# Patient Record
Sex: MALE | Race: WHITE | NOT HISPANIC OR LATINO | URBAN - METROPOLITAN AREA
[De-identification: names, ages, dates, MRNs, and addresses within clinical notes are randomized per-mention and may not be internally consistent; named-entity substitution may affect disease eponyms.]

---

## 2017-07-26 ENCOUNTER — OFFICE VISIT (OUTPATIENT)
Dept: FAMILY MEDICINE | Facility: CLINIC | Age: 37
End: 2017-07-26
Payer: COMMERCIAL

## 2017-07-26 VITALS
OXYGEN SATURATION: 98 % | DIASTOLIC BLOOD PRESSURE: 68 MMHG | HEART RATE: 51 BPM | HEIGHT: 72 IN | TEMPERATURE: 98.3 F | SYSTOLIC BLOOD PRESSURE: 108 MMHG | WEIGHT: 184 LBS | BODY MASS INDEX: 24.92 KG/M2

## 2017-07-26 DIAGNOSIS — Z13.1 SCREENING FOR DIABETES MELLITUS: ICD-10-CM

## 2017-07-26 DIAGNOSIS — Z00.00 ENCOUNTER FOR ROUTINE ADULT HEALTH EXAMINATION WITHOUT ABNORMAL FINDINGS: Primary | ICD-10-CM

## 2017-07-26 DIAGNOSIS — Z13.6 CARDIOVASCULAR SCREENING; LDL GOAL LESS THAN 160: ICD-10-CM

## 2017-07-26 DIAGNOSIS — K64.9 HEMORRHOIDS, UNSPECIFIED HEMORRHOID TYPE: ICD-10-CM

## 2017-07-26 LAB
CHOLEST SERPL-MCNC: 180 MG/DL
ERYTHROCYTE [DISTWIDTH] IN BLOOD BY AUTOMATED COUNT: 13.2 % (ref 10–15)
GLUCOSE SERPL-MCNC: 90 MG/DL (ref 70–99)
HCT VFR BLD AUTO: 45.9 % (ref 40–53)
HDLC SERPL-MCNC: 65 MG/DL
HGB BLD-MCNC: 15.1 G/DL (ref 13.3–17.7)
LDLC SERPL CALC-MCNC: 90 MG/DL
MCH RBC QN AUTO: 31.2 PG (ref 26.5–33)
MCHC RBC AUTO-ENTMCNC: 32.9 G/DL (ref 31.5–36.5)
MCV RBC AUTO: 95 FL (ref 78–100)
NONHDLC SERPL-MCNC: 115 MG/DL
PLATELET # BLD AUTO: 188 10E9/L (ref 150–450)
RBC # BLD AUTO: 4.84 10E12/L (ref 4.4–5.9)
TRIGL SERPL-MCNC: 125 MG/DL
TSH SERPL DL<=0.005 MIU/L-ACNC: 1.92 MU/L (ref 0.4–4)
WBC # BLD AUTO: 5.1 10E9/L (ref 4–11)

## 2017-07-26 PROCEDURE — 84443 ASSAY THYROID STIM HORMONE: CPT | Performed by: PHYSICIAN ASSISTANT

## 2017-07-26 PROCEDURE — 85027 COMPLETE CBC AUTOMATED: CPT | Performed by: PHYSICIAN ASSISTANT

## 2017-07-26 PROCEDURE — 80061 LIPID PANEL: CPT | Performed by: PHYSICIAN ASSISTANT

## 2017-07-26 PROCEDURE — 99395 PREV VISIT EST AGE 18-39: CPT | Performed by: PHYSICIAN ASSISTANT

## 2017-07-26 PROCEDURE — 82947 ASSAY GLUCOSE BLOOD QUANT: CPT | Performed by: PHYSICIAN ASSISTANT

## 2017-07-26 PROCEDURE — 36415 COLL VENOUS BLD VENIPUNCTURE: CPT | Performed by: PHYSICIAN ASSISTANT

## 2017-07-26 RX ORDER — POLYETHYLENE GLYCOL 3350 17 G/17G
1 POWDER, FOR SOLUTION ORAL DAILY
COMMUNITY
End: 2018-03-05

## 2017-07-26 NOTE — LETTER
Artie Davis  [address] [address]        July 31, 2017          Dear ,    We are writing to inform you of your test results.    -Cholesterol levels (LDL,HDL, Triglycerides) are normal. ADVISE: rechecking in 5 years.  -TSH (thyroid stimulating hormone) level is normal which indicates normal thyroid function.  -Normal red blood cell (hgb) levels, normal white blood cell count and normal platelet levels.  -Glucose (diabetic screening test) is normal.    Resulted Orders   TSH with free T4 reflex   Result Value Ref Range    TSH 1.92 0.40 - 4.00 mU/L   CBC with platelets   Result Value Ref Range    WBC 5.1 4.0 - 11.0 10e9/L    RBC Count 4.84 4.4 - 5.9 10e12/L    Hemoglobin 15.1 13.3 - 17.7 g/dL    Hematocrit 45.9 40.0 - 53.0 %    MCV 95 78 - 100 fl    MCH 31.2 26.5 - 33.0 pg    MCHC 32.9 31.5 - 36.5 g/dL    RDW 13.2 10.0 - 15.0 %    Platelet Count 188 150 - 450 10e9/L   Lipid panel reflex to direct LDL   Result Value Ref Range    Cholesterol 180 <200 mg/dL    Triglycerides 125 <150 mg/dL      Comment:      Fasting specimen    HDL Cholesterol 65 >39 mg/dL    LDL Cholesterol Calculated 90 <100 mg/dL      Comment:      Desirable:       <100 mg/dl    Non HDL Cholesterol 115 <130 mg/dL   Glucose   Result Value Ref Range    Glucose 90 70 - 99 mg/dL      Comment:      Fasting specimen       If you have any questions or concerns, please call the clinic at the number listed above.       Sincerely,        Apolonia Jamil PA-C

## 2017-07-26 NOTE — NURSING NOTE
Chief Complaint   Patient presents with     Physical       Initial /68  Pulse 51  Temp 98.3  F (36.8  C) (Oral)  Ht 6' (1.829 m)  Wt 184 lb (83.5 kg)  SpO2 98%  BMI 24.95 kg/m2 Estimated body mass index is 24.95 kg/(m^2) as calculated from the following:    Height as of this encounter: 6' (1.829 m).    Weight as of this encounter: 184 lb (83.5 kg).  Medication Reconciliation: complete

## 2017-07-26 NOTE — PROGRESS NOTES
"  SUBJECTIVE:   CC: Artie Davis is an 36 year old male who presents for preventative health visit.     Healthy Habits:    Do you get at least three servings of calcium containing foods daily (dairy, green leafy vegetables, etc.)? yes    Amount of exercise or daily activities, outside of work: 5 day(s) per week - used to be 300 lbs and started exercise routine and was able to obtain normal BMI.     Problems taking medications regularly No    Medication side effects: No    Have you had an eye exam in the past two years? yes    Do you see a dentist twice per year? yes  Do you have sleep apnea, excessive snoring or daytime drowsiness? no    1) Chronic hemorrhoid and anal fissure issue for 8-10 yrs. Was evaluate at colorectal specialists last year and has a follow-up next week for same. Reports he's tried everything like increasing fiber and miralax, but is wondering if he should have a surgery. Concerned about chronic use of miralax.   Concerned if there is any increased risk for cancer as a result of this history.  Takes 1/2 dose of miralax or full serving daily.   If keeps diet stable does well, but if changes anything he \"pays the price\" and fissure will cause problems for him.  Was screened for thyroid disease and this was negative. Doesn't feel like he's constipated.   A few drops of blood sometimes, but not regularly.   Stools BID and denies any constipation.    Today's PHQ-2 Score:   PHQ-2 ( 1999 Pfizer) 5/12/2014 6/19/2012   Q1: Little interest or pleasure in doing things 0 0   Q2: Feeling down, depressed or hopeless 0 0   PHQ-2 Score 0 0       Abuse: Current or Past(Physical, Sexual or Emotional)- No  Do you feel safe in your environment - Yes    Social History   Substance Use Topics     Smoking status: Never Smoker     Smokeless tobacco: Never Used     Alcohol use 5.4 oz/week     9 Standard drinks or equivalent per week     The patient does not drink >3 drinks per day nor >7 drinks per week.    Last PSA: No " results found for: PSA    Reviewed orders with patient. Reviewed health maintenance and updated orders accordingly - Yes  BP Readings from Last 3 Encounters:   07/26/17 108/68   10/16/15 120/80   05/12/14 102/60    Wt Readings from Last 3 Encounters:   07/26/17 184 lb (83.5 kg)   10/16/15 186 lb (84.4 kg)   05/12/14 189 lb (85.7 kg)                  Patient Active Problem List   Diagnosis     CARDIOVASCULAR SCREENING; LDL GOAL LESS THAN 160     Trochanteric bursitis, right     Callus of foot     Past Surgical History:   Procedure Laterality Date     HC TOOTH EXTRACTION W/FORCEP  1999     HERNIA REPAIR  1987    Double hernia repair, groin       Social History   Substance Use Topics     Smoking status: Never Smoker     Smokeless tobacco: Never Used     Alcohol use 5.4 oz/week     9 Standard drinks or equivalent per week     Family History   Problem Relation Age of Onset     Cardiovascular Father      CAD         Current Outpatient Prescriptions   Medication Sig Dispense Refill     polyethylene glycol (MIRALAX/GLYCOLAX) powder Take 1 capful by mouth daily       Multiple Vitamin (MULTIVITAMINS PO) Take  by mouth.       No Known Allergies        Reviewed and updated as needed this visit by clinical staffTobacco  Allergies  Meds  Med Hx  Surg Hx  Fam Hx  Soc Hx        Reviewed and updated as needed this visit by Provider  Tobacco  Allergies  Meds  Med Hx  Surg Hx  Fam Hx  Soc Hx             ROS:  C: NEGATIVE for fever, chills, change in weight  I: NEGATIVE for worrisome rashes, moles or lesions  E: NEGATIVE for vision changes or irritation  ENT: NEGATIVE for ear, mouth and throat problems  R: NEGATIVE for significant cough or SOB  CV: NEGATIVE for chest pain, palpitations or peripheral edema  GI: + for anal fissure. NEGATIVE for nausea, abdominal pain, heartburn, or change in bowel habits   male: negative for dysuria, hematuria, decreased urinary stream, erectile dysfunction, urethral discharge  M: NEGATIVE  for significant arthralgias or myalgia  N: NEGATIVE for weakness, dizziness or paresthesias  P: NEGATIVE for changes in mood or affect    OBJECTIVE:   /68  Pulse 51  Temp 98.3  F (36.8  C) (Oral)  Ht 6' (1.829 m)  Wt 184 lb (83.5 kg)  SpO2 98%  BMI 24.95 kg/m2  EXAM:  GENERAL: healthy, alert and no distress  EYES: Eyes grossly normal to inspection, PERRL and conjunctivae and sclerae normal  HENT: ear canals and TM's normal, nose and mouth without ulcers or lesions  NECK: no adenopathy, no asymmetry, masses, or scars and thyroid normal to palpation  RESP: lungs clear to auscultation - no rales, rhonchi or wheezes  CV: sinus bradycardia c/w hx of being an active runner, normal S1 S2, no S3 or S4, no murmur, click or rub, no peripheral edema and peripheral pulses strong  ABDOMEN: soft, nontender, no hepatosplenomegaly, no masses and bowel sounds normal  RECTAL: almost circumferential external hemorrhoids noted with largest in region of 6-7'o'clock position. No thrombosis. Pt declines BENIGNO as will be seeing colo-rectal specialist next week and this can cause disruption to know anal fissures so further exam not completed today and deferred to specialist.  MS: no gross musculoskeletal defects noted, no edema  SKIN: no suspicious lesions or rashes  NEURO: Normal strength and tone, mentation intact and speech normal  PSYCH: mentation appears normal, affect normal/bright    ASSESSMENT/PLAN:       ICD-10-CM    1. Encounter for routine adult health examination without abnormal findings Z00.00    2. Hemorrhoids, unspecified hemorrhoid type K64.9 TSH with free T4 reflex     CBC with platelets   3. CARDIOVASCULAR SCREENING; LDL GOAL LESS THAN 160 Z13.6 Lipid panel reflex to direct LDL   4. Screening for diabetes mellitus Z13.1 Glucose   Most of our discussion centered around his hx of hemorrhoids and maximizing preventative measures with staying well hydrated, increase fiber intake and use of miralax to keep stools  soft. Pt wondered about increase risk of cancer due to history of fissure and hemorrhoid and reviewed with him that to my knowledge I do not know that to be true. Certainly should he wish we can always have him complete a colonoscopy. Pt does not feel he has any obstructive pathology and can stool easily though so declines. I encouraged him to discuss further with colo-rectal specialists and he will be seen next week as planned.   Will obtain fasting labs and f/u with up with results.     COUNSELING:  Reviewed preventive health counseling, as reflected in patient instructions       Regular exercise       Healthy diet/nutrition       Colon cancer screening       reports that he has never smoked. He has never used smokeless tobacco.      Estimated body mass index is 24.95 kg/(m^2) as calculated from the following:    Height as of this encounter: 6' (1.829 m).    Weight as of this encounter: 184 lb (83.5 kg).       Counseling Resources:  ATP IV Guidelines  Pooled Cohorts Equation Calculator  FRAX Risk Assessment  ICSI Preventive Guidelines  Dietary Guidelines for Americans, 2010  USDA's MyPlate  ASA Prophylaxis  Lung CA Screening    Apolonia Jamil PA-C  [facility]

## 2017-07-26 NOTE — MR AVS SNAPSHOT
After Visit Summary   7/26/2017    Artie Davis    MRN: [id number]           Patient Information     Date Of Birth          1980        Visit Information        Provider Department      7/26/2017 8:20 AM Apolonia Jamil PA-C [facility] [place]        Today's Diagnoses     Hemorrhoids, unspecified hemorrhoid type    -  1    CARDIOVASCULAR SCREENING; LDL GOAL LESS THAN 160        Screening for diabetes mellitus          Care Instructions      Preventive Health Recommendations  Male Ages 26 - 39    Yearly exam:             See your health care provider every year in order to  o   Review health changes.   o   Discuss preventive care.    o   Review your medicines if your doctor has prescribed any.    You should be tested each year for STDs (sexually transmitted diseases), if you re at risk.     After age 35, talk to your provider about cholesterol testing. If you are at risk for heart disease, have your cholesterol tested at least every 5 years.     If you are at risk for diabetes, you should have a diabetes test (fasting glucose).  Shots: Get a flu shot each year. Get a tetanus shot every 10 years.     Nutrition:    Eat at least 5 servings of fruits and vegetables daily.     Eat whole-grain bread, whole-wheat pasta and brown rice instead of white grains and rice.     Talk to your provider about Calcium and Vitamin D.     Lifestyle    Exercise for at least 150 minutes a week (30 minutes a day, 5 days a week). This will help you control your weight and prevent disease.     Limit alcohol to one drink per day.     No smoking.     Wear sunscreen to prevent skin cancer.     See your dentist every six months for an exam and cleaning.             Follow-ups after your visit        Who to contact     If you have questions or need follow up information about today's clinic visit or your schedule please contact [facility] [place] directly at [phone].  Normal or non-critical lab and  "imaging results will be communicated to you by MyChart, letter or phone within 4 business days after the clinic has received the results. If you do not hear from us within 7 days, please contact the clinic through Tyche or phone. If you have a critical or abnormal lab result, we will notify you by phone as soon as possible.  Submit refill requests through Tyche or call your pharmacy and they will forward the refill request to us. Please allow 3 business days for your refill to be completed.          Additional Information About Your Visit        Tyche Information     Tyche lets you send messages to your doctor, view your test results, renew your prescriptions, schedule appointments and more. To sign up, go to [email or web address] . Click on \"Log in\" on the left side of the screen, which will take you to the Welcome page. Then click on \"Sign up Now\" on the right side of the page.     You will be asked to enter the access code listed below, as well as some personal information. Please follow the directions to create your username and password.     Your access code is: [email or web address]  Expires: 10/24/2017  9:04 AM     Your access code will  in 90 days. If you need help or a new code, please call your [place] clinic or [phone].        Care EveryWhere ID     This is your Care EveryWhere ID. This could be used by other organizations to access your [place] medical records  [id number]        Your Vitals Were     Pulse Temperature Height Pulse Oximetry BMI (Body Mass Index)       51 98.3  F (36.8  C) (Oral) 6' (1.829 m) 98% 24.95 kg/m2        Blood Pressure from Last 3 Encounters:   17 108/68   10/16/15 120/80   14 102/60    Weight from Last 3 Encounters:   17 184 lb (83.5 kg)   10/16/15 186 lb (84.4 kg)   14 189 lb (85.7 kg)              We Performed the Following     CBC with platelets     Glucose     Lipid panel reflex to direct LDL     TSH with free T4 reflex        Primary " Care Provider    [facility] [place]       No address on file        Equal Access to Services     GILBERTO MILLER : Hadii aad ku hadalerobert Toniamartina, wadaniellada maitegerardoha, feliciata normaselvinaydin iniguez, ko griderpippaelmo alba. So [facility] [phone].    ATENCIÓN: Si habla español, tiene a hassan disposición servicios gratuitos de asistencia lingüística. Llame al [phone].    We comply with applicable federal civil rights laws and [place] laws. We do not discriminate on the basis of race, color, national origin, age, disability sex, sexual orientation or gender identity.            Thank you!     Thank you for choosing [facility]  for your care. Our goal is always to provide you with excellent care. Hearing back from our patients is one way we can continue to improve our services. Please take a few minutes to complete the written survey that you may receive in the mail after your visit with us. Thank you!             Your Updated Medication List - Protect others around you: Learn how to safely use, store and throw away your medicines at www.disposemymeds.org.          This list is accurate as of: 7/26/17  9:04 AM.  Always use your most recent med list.                   Brand Name Dispense Instructions for use Diagnosis    MULTIVITAMINS PO      Take  by mouth.        polyethylene glycol powder    MIRALAX/GLYCOLAX     Take 1 capful by mouth daily

## 2017-07-29 NOTE — PROGRESS NOTES
Please call or write patient with the following results:    -Cholesterol levels (LDL,HDL, Triglycerides) are normal. ADVISE: rechecking in 5 years.  -TSH (thyroid stimulating hormone) level is normal which indicates normal thyroid function.  -Normal red blood cell (hgb) levels, normal white blood cell count and normal platelet levels.  -Glucose (diabetic screening test) is normal.    Electronically Signed By: Apolonia Jamil PA-C

## 2018-03-05 ENCOUNTER — OFFICE VISIT (OUTPATIENT)
Dept: FAMILY MEDICINE | Facility: CLINIC | Age: 38
End: 2018-03-05
Payer: COMMERCIAL

## 2018-03-05 VITALS
OXYGEN SATURATION: 100 % | BODY MASS INDEX: 25.87 KG/M2 | HEIGHT: 72 IN | HEART RATE: 46 BPM | WEIGHT: 191 LBS | TEMPERATURE: 97.8 F | DIASTOLIC BLOOD PRESSURE: 64 MMHG | SYSTOLIC BLOOD PRESSURE: 106 MMHG

## 2018-03-05 DIAGNOSIS — M54.6 ACUTE BILATERAL THORACIC BACK PAIN: Primary | ICD-10-CM

## 2018-03-05 PROCEDURE — 99213 OFFICE O/P EST LOW 20 MIN: CPT | Performed by: FAMILY MEDICINE

## 2018-03-05 RX ORDER — CYCLOBENZAPRINE HCL 10 MG
5-10 TABLET ORAL 3 TIMES DAILY PRN
Qty: 30 TABLET | Refills: 1 | Status: SHIPPED | OUTPATIENT
Start: 2018-03-05

## 2018-03-05 NOTE — MR AVS SNAPSHOT
After Visit Summary   3/5/2018    Artie Davis    MRN: [id number]           Patient Information     Date Of Birth          1980        Visit Information        Provider Department      3/5/2018 2:40 PM Attila Rios,  [facility] [place]        Today's Diagnoses     Acute bilateral thoracic back pain    -  1      Care Instructions    When You Have Low Back Pain    Caring for Your Back  You are not alone.    Low back pain is very common. Nearly half of all adults have low back pain in any given year. The good news is that back pain is rarely a danger to your health. Most people can manage their back pain on their own and about half of them start feeling better within 2 weeks. In 9 out of 10 cases, low back pain goes away or no longer limits daily activity within 6 weeks.     Your outlook is good!    Your symptoms tell us that your low back pain is most likely not a danger to you. Most of the time we do not know the exact cause of low back pain, even if you see a doctor or have an MRI. However, treatment can still work without knowing the cause of the pain. Less than 1 in 100 people need surgery for their back pain.     What can I do about my low back pain?     There are three things you can do to ease low back pain and help it go away.    Use heat or cold packs.    Take medicine as directed.    Use positions, movements and exercises.     Using heat or cold packs    Try cold packs or gentle heat to ease your pain. Use whichever gives you the most relief. Apply the cold pack or heat for 15 minutes at a time, as often as needed.    Taking medicine      If your doctor has prescribed medicine, be sure to follow the directions.    If you take over-the-counter medicine, read and follow the directions.    Talk to your doctor if you have any questions.     Using positions, movements and exercises    Research tells us that moving your joints and muscles can help you recover from back pain. Such  activity should be simple and gentle. Use the positions in the photos as well as walking to help relieve your pain. Try taking a short walk every 3 to 4 hours during the day. Walk for a few minutes inside your home or take longer walks outside, on a treadmill or at a mall. Slowly increase the amount of time you walk. Expect discomfort when you begin, but it should lessen as your back starts to heal. When your back feels better, walk daily to keep your back and body healthy.    Finding a comfortable position    When your back pain is new, certain positions will ease your pain. Gently try each of the positions below until you find one that is helpful. Once you find a position of comfort, use it as often as you like when you are resting. You will recover faster if you combine rest with activity.         Lie on your back with your legs bent. You can do this by placing a pillow under your knees. Or you may lie on the floor and rest your lower legs on the seat of a chair.       Lie on your side with your knees bent, and place a pillow between your knees.       Lie on your stomach over pillows.      When should I call my doctor?    Your back pain should improve over the first couple of weeks. As it improves, you should be able to return to your normal activities. But call your doctor if:    You have a sudden change in your ability to control your bladder or bowels.    You feel tingling in your groin or legs.    The pain spreads down your leg and into your foot.    Your toes, feet or leg muscles feel weak.    You feel generally unwell or sick.    Your pain does not get better or gets worse.      If you are deaf or hard of hearing, please let us know. We provide many free services including sign language interpreters,oral interpreters, TTYs, telephone amplifiers, note takers and written materials.    For informational purposes only. Not to replace the advice of your health care provider. Copyright   2013 [facility]  "Services. All rights reserved. [employer] 163466 - Rev .            Follow-ups after your visit        Follow-up notes from your care team     Return if symptoms worsen or fail to improve.      Who to contact     If you have questions or need follow up information about today's clinic visit or your schedule please contact [facility] [place] directly at [phone].  Normal or non-critical lab and imaging results will be communicated to you by MyChart, letter or phone within 4 business days after the clinic has received the results. If you do not hear from us within 7 days, please contact the clinic through [employer] or phone. If you have a critical or abnormal lab result, we will notify you by phone as soon as possible.  Submit refill requests through GLG or call your pharmacy and they will forward the refill request to us. Please allow 3 business days for your refill to be completed.          Additional Information About Your Visit        GLG Information     GLG lets you send messages to your doctor, view your test results, renew your prescriptions, schedule appointments and more. To sign up, go to [email or web address] . Click on \"Log in\" on the left side of the screen, which will take you to the Welcome page. Then click on \"Sign up Now\" on the right side of the page.     You will be asked to enter the access code listed below, as well as some personal information. Please follow the directions to create your username and password.     Your access code is: [email or web address]  Expires: 6/3/2018  3:18 PM     Your access code will  in 90 days. If you need help or a new code, please call your [place] clinic or [phone].        Care EveryWhere ID     This is your Care EveryWhere ID. This could be used by other organizations to access your [place] medical records  [id number]        Your Vitals Were     Pulse Temperature Height Pulse Oximetry BMI (Body Mass Index)       46 97.8  F (36.6  C) " (Oral) 6' (1.829 m) 100% 25.9 kg/m2        Blood Pressure from Last 3 Encounters:   03/05/18 106/64   07/26/17 108/68   10/16/15 120/80    Weight from Last 3 Encounters:   03/05/18 191 lb (86.6 kg)   07/26/17 184 lb (83.5 kg)   10/16/15 186 lb (84.4 kg)              Today, you had the following     No orders found for display         Today's Medication Changes          These changes are accurate as of 3/5/18  3:18 PM.  If you have any questions, ask your nurse or doctor.               Start taking these medicines.        Dose/Directions    cyclobenzaprine 10 MG tablet   Commonly known as:  FLEXERIL   Used for:  Acute bilateral thoracic back pain   Started by:  Attila Rios DO        Dose:  5-10 mg   Take 0.5-1 tablets (5-10 mg) by mouth 3 times daily as needed for muscle spasms   Quantity:  30 tablet   Refills:  1            Where to get your medicines      These medications were sent [place] [facility] [place] - [address]  [address] [facility] [address]     Phone:  [phone]     cyclobenzaprine 10 MG tablet                Primary Care Provider Office Phone # Fax #    [facility] [phone] [address]  [place] [address]        Equal Access to Services     GILBERTO MILLER AH: Hadii mica kohli hadasho Somartina, waaxda luqadaha, qaybta kaalmada hira, ko alba. So [facility] [phone].    ATENCIÓN: Si habla español, tiene a hassan disposición servicios gratuitos de asistencia lingüística. Danika al [phone].    We comply with applicable federal civil rights laws and [place] laws. We do not discriminate on the basis of race, color, national origin, age, disability, sex, sexual orientation, or gender identity.            Thank you!     Thank you for choosing [facility]  for your care. Our goal is always to provide you with excellent care. Hearing back from our patients is one way we can continue to improve our  services. Please take a few minutes to complete the written survey that you may receive in the mail after your visit with us. Thank you!             Your Updated Medication List - Protect others around you: Learn how to safely use, store and throw away your medicines at www.disposemymeds.org.          This list is accurate as of 3/5/18  3:18 PM.  Always use your most recent med list.                   Brand Name Dispense Instructions for use Diagnosis    cyclobenzaprine 10 MG tablet    FLEXERIL    30 tablet    Take 0.5-1 tablets (5-10 mg) by mouth 3 times daily as needed for muscle spasms    Acute bilateral thoracic back pain       MULTIVITAMINS PO      Take  by mouth.

## 2018-03-05 NOTE — PROGRESS NOTES
SUBJECTIVE:   Artie Davis is a 37 year old male who presents to clinic today for the following health issues:      Back Pain       Duration: X2 Days         Specific cause: maybe from working out     Description:   Location of pain: goes across the mid thoracic region bilaterally; worse on the left.   Character of pain: sharp and sore   Pain radiation:none  New numbness or weakness in legs, not attributed to pain:  no     Intensity:  moderate    History:   Pain interferes with job: YES, stayed home today   History of back problems: recurrent self limited episodes of low back pain in the past  Any previous MRI or X-rays: maybe in 2007 had some back pain that was resolved with traction   Sees a specialist for back pain:  No  Therapies tried without relief: Ibuprofen and heat    Alleviating factors:   Improved by: none       Precipitating factors:  Worsened by: Lying Flat - cannot sleep     Functional and Psychosocial Screen (Akiko STarT Back):      Not performed today    He tried taking ibuprofen, taking a hot shower, using Theracaine, foam rolling, and even rocking back and forth on his back -- seemed to help a little. Lying flat on his back at night is the most painful -- had trouble finding a comfortable position to sleep. Twisting movements and doing motion that activates back muscles exacerbate the pain.      Accompanying Signs & Symptoms:  Risk of Fracture:  None  Risk of Cauda Equina:  None  Risk of Infection:  None  Risk of Cancer:  None  Risk of Ankylosing Spondylitis:  Onset at age <35, male, AND morning back stiffness. no       Problem list and histories reviewed & adjusted, as indicated.  Additional history: as documented    Patient Active Problem List   Diagnosis     CARDIOVASCULAR SCREENING; LDL GOAL LESS THAN 160     Trochanteric bursitis, right     Callus of foot     Past Surgical History:   Procedure Laterality Date     HC TOOTH EXTRACTION W/FORCEP  1999     HERNIA REPAIR  1987    Double hernia  repair, groin       Social History   Substance Use Topics     Smoking status: Never Smoker     Smokeless tobacco: Never Used     Alcohol use 5.4 oz/week     9 Standard drinks or equivalent per week     Family History   Problem Relation Age of Onset     CLOTTING DISORDER Father      anti-coagulation     CEREBROVASCULAR DISEASE Maternal Grandfather      70's     Heart Surgery Maternal Grandfather      valve repair     Hypertension Maternal Aunt      DIABETES No family hx of      Coronary Artery Disease No family hx of      Breast Cancer No family hx of      Colon Cancer No family hx of            Reviewed and updated as needed this visit by clinical staff  Tobacco  Allergies  Meds  Problems  Med Hx  Surg Hx  Fam Hx  Soc Hx        Reviewed and updated as needed this visit by Provider  Allergies  Meds  Problems         ROS:  Constitutional, HEENT, cardiovascular, pulmonary, gi and gu systems are negative, except as otherwise noted.    OBJECTIVE:     /64  Pulse (!) 46  Temp 97.8  F (36.6  C) (Oral)  Ht 6' (1.829 m)  Wt 191 lb (86.6 kg)  SpO2 100%  BMI 25.9 kg/m2  Body mass index is 25.9 kg/(m^2).  GENERAL: healthy, alert and no distress  NECK: no adenopathy, no asymmetry, masses, or scars and thyroid normal to palpation  RESP: lungs clear to auscultation - no rales, rhonchi or wheezes  CV: regular rate and rhythm, normal S1 S2, no S3 or S4, no murmur, click or rub, no peripheral edema and peripheral pulses strong  ABDOMEN: soft, nontender, no hepatosplenomegaly, no masses and bowel sounds normal  MS: no gross musculoskeletal defects noted, no edema  Comprehensive back pain exam: spasm of thoracic paraspinal musculature noted on exam No tenderness, Range of motion not limited by pain, Lower extremity strength functional and equal on both sides, Lower extremity reflexes within normal limits bilaterally, Lower extremity sensation normal and equal on both sides and Straight leg raise negative  bilaterally    Diagnostic Test Results:  none     ASSESSMENT/PLAN:   1. Acute bilateral thoracic back pain: symptoms consistent with upper paraspinal muscle spasm. Recommend symptomatic management. Can also try muscle relaxant as well, particularly at night. Return to clinic if symptoms worsen; consider physical therapy.  - cyclobenzaprine (FLEXERIL) 10 MG tablet; Take 0.5-1 tablets (5-10 mg) by mouth 3 times daily as needed for muscle spasms  Dispense: 30 tablet; Refill: 1    Attila Rios DO  [facility]

## 2018-03-05 NOTE — NURSING NOTE
Chief Complaint   Patient presents with     Back Pain       Initial /64  Pulse (!) 46  Temp 97.8  F (36.6  C) (Oral)  Ht 6' (1.829 m)  Wt 191 lb (86.6 kg)  SpO2 100%  BMI 25.9 kg/m2 Estimated body mass index is 25.9 kg/(m^2) as calculated from the following:    Height as of this encounter: 6' (1.829 m).    Weight as of this encounter: 191 lb (86.6 kg).  Medication Reconciliation: complete   Leona Dodd Certified Medical Assistant

## 2018-03-05 NOTE — PATIENT INSTRUCTIONS
When You Have Low Back Pain    Caring for Your Back  You are not alone.    Low back pain is very common. Nearly half of all adults have low back pain in any given year. The good news is that back pain is rarely a danger to your health. Most people can manage their back pain on their own and about half of them start feeling better within 2 weeks. In 9 out of 10 cases, low back pain goes away or no longer limits daily activity within 6 weeks.     Your outlook is good!    Your symptoms tell us that your low back pain is most likely not a danger to you. Most of the time we do not know the exact cause of low back pain, even if you see a doctor or have an MRI. However, treatment can still work without knowing the cause of the pain. Less than 1 in 100 people need surgery for their back pain.     What can I do about my low back pain?     There are three things you can do to ease low back pain and help it go away.    Use heat or cold packs.    Take medicine as directed.    Use positions, movements and exercises.     Using heat or cold packs    Try cold packs or gentle heat to ease your pain. Use whichever gives you the most relief. Apply the cold pack or heat for 15 minutes at a time, as often as needed.    Taking medicine      If your doctor has prescribed medicine, be sure to follow the directions.    If you take over-the-counter medicine, read and follow the directions.    Talk to your doctor if you have any questions.     Using positions, movements and exercises    Research tells us that moving your joints and muscles can help you recover from back pain. Such activity should be simple and gentle. Use the positions in the photos as well as walking to help relieve your pain. Try taking a short walk every 3 to 4 hours during the day. Walk for a few minutes inside your home or take longer walks outside, on a treadmill or at a mall. Slowly increase the amount of time you walk. Expect discomfort when you begin, but it should  lessen as your back starts to heal. When your back feels better, walk daily to keep your back and body healthy.    Finding a comfortable position    When your back pain is new, certain positions will ease your pain. Gently try each of the positions below until you find one that is helpful. Once you find a position of comfort, use it as often as you like when you are resting. You will recover faster if you combine rest with activity.         Lie on your back with your legs bent. You can do this by placing a pillow under your knees. Or you may lie on the floor and rest your lower legs on the seat of a chair.       Lie on your side with your knees bent, and place a pillow between your knees.       Lie on your stomach over pillows.      When should I call my doctor?    Your back pain should improve over the first couple of weeks. As it improves, you should be able to return to your normal activities. But call your doctor if:    You have a sudden change in your ability to control your bladder or bowels.    You feel tingling in your groin or legs.    The pain spreads down your leg and into your foot.    Your toes, feet or leg muscles feel weak.    You feel generally unwell or sick.    Your pain does not get better or gets worse.      If you are deaf or hard of hearing, please let us know. We provide many free services including sign language interpreters,oral interpreters, TTYs, telephone amplifiers, note takers and written materials.    For informational purposes only. Not to replace the advice of your health care provider. Copyright   2013 [facility]. All rights reserved. [employer] 142821 - Rev 06/14.

## 2019-05-31 ENCOUNTER — OFFICE VISIT (OUTPATIENT)
Dept: FAMILY MEDICINE | Facility: CLINIC | Age: 39
End: 2019-05-31
Payer: COMMERCIAL

## 2019-05-31 VITALS
SYSTOLIC BLOOD PRESSURE: 124 MMHG | WEIGHT: 189 LBS | HEART RATE: 76 BPM | OXYGEN SATURATION: 96 % | DIASTOLIC BLOOD PRESSURE: 78 MMHG | TEMPERATURE: 97.8 F | BODY MASS INDEX: 25.63 KG/M2

## 2019-05-31 DIAGNOSIS — Z12.83 SKIN EXAM, SCREENING FOR CANCER: ICD-10-CM

## 2019-05-31 DIAGNOSIS — Z00.00 ENCOUNTER FOR PREVENTATIVE ADULT HEALTH CARE EXAMINATION: Primary | ICD-10-CM

## 2019-05-31 PROCEDURE — 99395 PREV VISIT EST AGE 18-39: CPT | Performed by: FAMILY MEDICINE

## 2019-05-31 RX ORDER — MULTIVIT,TX WITH IRON,MINERALS
500 TABLET, EXTENDED RELEASE ORAL 2 TIMES DAILY
COMMUNITY

## 2019-05-31 NOTE — PROGRESS NOTES
SUBJECTIVE:   CC: Artie Davis is an 38 year old male who presents for preventive health visit.     Healthy Habits:    Do you get at least three servings of calcium containing foods daily (dairy, green leafy vegetables, etc.)? yes    Amount of exercise or daily activities, outside of work: 3 day(s) per week    Problems taking medications regularly No    Medication side effects: No    Have you had an eye exam in the past two years? yes    Do you see a dentist twice per year? yes    Do you have sleep apnea, excessive snoring or daytime drowsiness?yes - wife says he snores    Had lab work completed already through work - everything was normal per his report.    Today's PHQ-2 Score:   PHQ-2 ( 1999 [employer]) 5/31/2019 3/5/2018   Q1: Little interest or pleasure in doing things 0 0   Q2: Feeling down, depressed or hopeless 0 0   PHQ-2 Score 0 0       Abuse: Current or Past(Physical, Sexual or Emotional)- No  Do you feel safe in your environment? Yes    Social History     Tobacco Use     Smoking status: Never Smoker     Smokeless tobacco: Never Used   Substance Use Topics     Alcohol use: Yes     Alcohol/week: 5.4 oz     Types: 9 Standard drinks or equivalent per week     If you drink alcohol do you typically have >3 drinks per day or >7 drinks per week? No                      Last PSA: No results found for: PSA    Reviewed orders with patient. Reviewed health maintenance and updated orders accordingly - Yes  BP Readings from Last 3 Encounters:   05/31/19 124/78   03/05/18 106/64   07/26/17 108/68    Wt Readings from Last 3 Encounters:   05/31/19 85.7 kg (189 lb)   03/05/18 86.6 kg (191 lb)   07/26/17 83.5 kg (184 lb)                    Reviewed and updated as needed this visit by clinical staff  Tobacco  Allergies  Meds  Problems  Med Hx  Surg Hx  Fam Hx         Reviewed and updated as needed this visit by Provider  Tobacco  Allergies  Meds  Problems  Med Hx  Surg Hx  Fam Hx        Past Medical History:    Diagnosis Date     Seasonal allergic rhinitis     Spring      Past Surgical History:   Procedure Laterality Date     HC TOOTH EXTRACTION W/FORCEP  1999     HERNIA REPAIR  1987    Double hernia repair, groin       ROS:  CONSTITUTIONAL: NEGATIVE for fever, chills, change in weight  INTEGUMENTARY/SKIN: NEGATIVE for worrisome rashes, moles or lesions  EYES: NEGATIVE for vision changes or irritation  ENT: NEGATIVE for ear, mouth and throat problems  RESP: NEGATIVE for significant cough or SOB  CV: NEGATIVE for chest pain, palpitations or peripheral edema  GI: NEGATIVE for nausea, abdominal pain, heartburn, or change in bowel habits   male: negative for dysuria, hematuria, decreased urinary stream, erectile dysfunction, urethral discharge  MUSCULOSKELETAL: NEGATIVE for significant arthralgias or myalgia  NEURO: NEGATIVE for weakness, dizziness or paresthesias  PSYCHIATRIC: NEGATIVE for changes in mood or affect    OBJECTIVE:   /78   Pulse 76   Temp 97.8  F (36.6  C) (Oral)   Wt 85.7 kg (189 lb)   SpO2 96%   BMI 25.63 kg/m    EXAM:  GENERAL: healthy, alert and no distress  EYES: Eyes grossly normal to inspection, PERRL and conjunctivae and sclerae normal  HENT: ear canals and TM's normal, nose and mouth without ulcers or lesions  NECK: no adenopathy and no asymmetry, masses, or scars  RESP: lungs clear to auscultation - no rales, rhonchi or wheezes  CV: regular rate and rhythm, normal S1 S2, no S3 or S4, no murmur, click or rub, no peripheral edema and peripheral pulses strong  ABDOMEN: soft, nontender, no hepatosplenomegaly, no masses and bowel sounds normal  MS: no gross musculoskeletal defects noted, no edema  SKIN: no suspicious lesions or rashes  PSYCH: mentation appears normal, affect normal/bright    Diagnostic Test Results:  none     ASSESSMENT/PLAN:   1. Encounter for preventative adult health care examination: health maintenance reviewed and updated. If he can bring in lab results from work, can  upload them to his chart.    2. Skin exam, screening for cancer: requests referral for screening skin exam. No obvious lesions of concern on exam today.  - DERMATOLOGY REFERRAL    COUNSELING:  Reviewed preventive health counseling, as reflected in patient instructions       Regular exercise       Healthy diet/nutrition    Estimated body mass index is 25.63 kg/m  as calculated from the following:    Height as of 3/5/18: 1.829 m (6').    Weight as of this encounter: 85.7 kg (189 lb).    Weight management plan: Discussed healthy diet and exercise guidelines     reports that he has never smoked. He has never used smokeless tobacco.      Counseling Resources:  ATP IV Guidelines  Pooled Cohorts Equation Calculator  FRAX Risk Assessment  ICSI Preventive Guidelines  Dietary Guidelines for Americans, 2010  USDA's MyPlate  ASA Prophylaxis  Lung CA Screening    Attila Rios DO  [facility]

## 2019-06-26 ENCOUNTER — DOCUMENTATION ONLY (OUTPATIENT)
Dept: CONSULT | Facility: CLINIC | Age: 39
End: 2019-06-26

## 2019-07-10 NOTE — PROGRESS NOTES
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 Specific details about this phone call are documented in his daughter's chart.     Lilburn syndrome is a genetic condition that is characterized by short stature, slow growth, sometimes structural differences of the heart or kidneys, and characteristic physical features (such as a deep groove in the area between the nose and mouth, low-set ears, high-arched palate, excess neck skin). Some individuals also have problems with easy bruising or prolonged bleeding, curvature of the spine, or slight problems with vision or hearing. Most individuals who have Lilburn syndrome have normal intelligence, but some individuals may require special education in school. Lilburn syndrome is a highly variable condition, in that even individuals in the same family with Lilburn syndrome can have more or less features of the condition.    In some cases, a child with Lilburn syndrome has a gene mutation that occurred brand new (de shelton) at the child s conception.  In other cases, a gene mutation can be inherited from an affected parent.  Due to the marked phenotypic variability of Lilburn syndrome, it is possible that Artie could also Lilburn syndrome. If Artie also has Lilburn syndrome, he may require additional health screenings/tests including cardiac and renal imaging, screening for lymphatic and coagulation disorders, hearing/vision screening, etc. Genetic testing for Artie and his partner will also clarify future reproductive risks for the couple. Therefore, this testing is considered medically necessary for Artie's continued health management.     I will submit an insurance prior authorization for this testing at the family's request. We will review benefits/limitations and obtain written consent if testing is still desired during his  daughter's follow-up appointment with genetics.     Jennifer Cabral  Licensed Genetic Counselor  [phone]

## 2019-08-05 ENCOUNTER — TELEPHONE (OUTPATIENT)
Dept: CONSULT | Facility: CLINIC | Age: 39
End: 2019-08-05

## 2019-08-05 DIAGNOSIS — Z84.81 FAMILY HISTORY OF CARRIER OF GENETIC DISEASE: Primary | ICD-10-CM

## 2019-08-05 NOTE — TELEPHONE ENCOUNTER
I called 08/05/2019 to update Artie on insurance coverage for his genetic testing (PA approval was received). However, I was unable to reach Artie.  I left a non-detailed voicemail with my name and phone number.    Flower Saldivar  Genomics Billing    [facility]   Molecular Diagnostics Laboratory  [address] [place] [id number]  [phone]

## 2019-08-05 NOTE — TELEPHONE ENCOUNTER
Notified Artie that we received prior authorization approval for genetic testing. Valid from 07/10/2019 to 12/31/2019. Authorization number is [id number].     Explained that insurance benefits may still apply, therefore, there could be an out of pocket cost. Provided Artie with estimated out of pocket cost for testing.    Artie expressed understanding and stated that he wants to proceed with testing. He will make an appointment on next week in the [facility] for blood draw. We will call when results are available. Artie had no further questions.    INGA Corey  Genomics Billing    [facility]   Molecular Diagnostics Laboratory  [address] [place] [id number]  [phone]

## 2019-09-10 ENCOUNTER — TELEPHONE (OUTPATIENT)
Dept: CONSULT | Facility: CLINIC | Age: 39
End: 2019-09-10

## 2019-09-10 NOTE — TELEPHONE ENCOUNTER
I contacted Artie's wife, Halina, regarding his targeted genetic testing. I have confirmed a self-pay price at [facility] of $99 for targeted testing. The family would like to proceed with testing at [facility] and would like to go in for lab draw this Thursday when they will be here for one of their daughter's appointments. I will contact scheduling to make this lab appointment. The family was asked to let the  know when they are done with their daughter's appointment so that the desk can page me and we can fill out [facility] paperwork. Halina was encouraged to contact me with questions/concerns in the meantime.    Jennifer Cabral  Licensed Genetic Counselor  [phone]

## 2019-09-12 DIAGNOSIS — Z84.81 FAMILY HISTORY OF CARRIER OF GENETIC DISEASE: ICD-10-CM

## 2019-09-12 DIAGNOSIS — Z84.89 FAMILY HISTORY OF GENETIC DISORDER: Primary | ICD-10-CM

## 2019-09-12 LAB — MISCELLANEOUS TEST: NORMAL

## 2019-09-25 ENCOUNTER — TELEPHONE (OUTPATIENT)
Dept: CONSULT | Facility: CLINIC | Age: 39
End: 2019-09-25

## 2019-09-25 LAB — LAB SCANNED RESULT: NORMAL

## 2019-09-26 NOTE — TELEPHONE ENCOUNTER
Artie returned my call and we reviewed the information in my previous note. He had no further questions or concerns at this time. The family is encouraged to contact us if we can be of any assistance and we look forward to seeing his daughter back in clinic.    Jennifer Cabral  Licensed Genetic Counselor  [phone]

## 2020-02-05 ENCOUNTER — OFFICE VISIT - HEALTHEAST (OUTPATIENT)
Dept: FAMILY MEDICINE | Facility: CLINIC | Age: 40
End: 2020-02-05

## 2020-02-05 ENCOUNTER — COMMUNICATION - HEALTHEAST (OUTPATIENT)
Dept: TELEHEALTH | Facility: CLINIC | Age: 40
End: 2020-02-05

## 2020-02-05 DIAGNOSIS — H10.9 BACTERIAL CONJUNCTIVITIS OF LEFT EYE: ICD-10-CM

## 2020-02-05 DIAGNOSIS — L30.9 DERMATITIS: ICD-10-CM

## 2020-02-05 DIAGNOSIS — J06.9 VIRAL URI: ICD-10-CM

## 2020-06-06 ENCOUNTER — NURSE TRIAGE (OUTPATIENT)
Dept: NURSING | Facility: CLINIC | Age: 40
End: 2020-06-06

## 2020-06-06 NOTE — TELEPHONE ENCOUNTER
"Pt calls in with concern of \" bump\" on   bottom of Right foot     Had been walking around in sandals/ barefoot last couple of days     Today - sat down in chair - then got up and noticed this \"bump-lump\" on bottom of foot    Size of a marble  Pain 2-3/10    Has Bad flat feet he says   Now apparently some brusing on top of foot    Pt says he/family did 1 hour walk today with minimal problem  When in shoes - \" ok\"    No fever  No difficulty breathing     Pt will monitor for now - wife is a pharmacist    Will check into finding  a Clinic where he might want to establish Primary Care  If no improvement - will call clinic to make appt or consider an Urgent Care if unable to get into clinic    Protocol and care advice reviewed  Caller states understanding of the recommended disposition  Advised to call back if further questions or concerns    Rene Pena RN / [facility]      Reason for Disposition    Caused by known bunions, plantar wart, or flat feet    Additional Information    Negative: [1] MILD pain (e.g., does not interfere with normal activities) AND [2] present > 7 days    Negative: Foot pain is a chronic symptom (recurrent or ongoing AND present > 4 weeks)    Negative: [1] MODERATE pain (e.g., interferes with normal activities, limping) AND [2] present > 3 days    Negative: [1] Numbness or tingling in feet AND [2] new or increased    Negative: Pain in the big toe joint    Negative: Looks like a boil, infected sore, or deep ulcer    Negative: [1] Redness of the skin AND [2] no fever    Negative: [1] Swollen foot AND [2] no fever  (Exceptions: localized bump from bunions, calluses, insect bite, sting)    Negative: Numbness in one foot (i.e., loss of sensation)    Negative: Entire foot is cool or blue in comparison to other foot    Negative: Purple or black skin on foot or toe    Negative: [1] Red area or streak AND [2] fever    Negative: [1] Swollen foot AND [2] fever    Negative: Patient sounds " very sick or weak to the triager    Negative: [1] SEVERE pain (e.g., excruciating, unable to do any normal activities) AND [2] not improved after 2 hours of pain medicine    Negative: [1] Looks infected (spreading redness, pus) AND [2] large red area (> 2 in. or 5 cm)    Protocols used: FOOT PAIN-A-AH

## 2020-08-24 ENCOUNTER — VIRTUAL VISIT (OUTPATIENT)
Dept: FAMILY MEDICINE | Facility: OTHER | Age: 40
End: 2020-08-24
Payer: COMMERCIAL

## 2020-08-24 PROCEDURE — 99421 OL DIG E/M SVC 5-10 MIN: CPT | Performed by: PHYSICIAN ASSISTANT

## 2020-08-24 NOTE — PROGRESS NOTES
"Date: 2020 18:18:40  Clinician: Devin Liu  Clinician NPI: [id number]  Patient: Artie Davis  Patient : 1980  Patient [place] [address] [place] [address]  Patient Phone: [phone]  Visit Protocol: Eczema  Patient Summary:  Artie is a 39 year old ( : 1980 ) male who initiated a Visit for evaluation of contact dermatitis. When asked the question \"Please sign me up to receive news, health information and promotions from [employer].\", Artie responded \"No\".    Images of his skin condition were not required due to the location of the rash.  His symptoms started 1-3 days ago. The rash is located on his buttocks. The rash is red in color.   It feels itchy. The symptoms interfere with his sleep.   Symptom details   Redness: The redness has not rapidly increased in size.   Denied symptoms include sores, drainage, dry skin, scabs, blisters, warm to touch, tender to touch, burning, pain, numbness, crusts, flaky skin, and scaly skin. Artie does not feel feverish.   Treatments or home remedies used to relieve the symptoms as reported by the patient (free text): Hydrocortisone 1% and Zyrtec.  May have temporarily reduced itching.  Redness has not improved.  Still itches.   Precipitating events  Artie did not come in contact with any irritants prior to the onset of his symptoms and has not been in close contact with anyone that has similar symptoms. He also did not spend time in a wooded area, swim, travel, or spend time in the sun just before his symptoms started.   Pertinent medical history  Artie has not experienced this skin condition before.   Artie has a history of seasonal allergies or hay fever and eczema.   Ongoing medical conditions were denied.   Artie does not smoke or use smokeless tobacco.   Additional information as reported by the patient (free text): Rash started on Friday and hasn't improved.  Woke up with dizziness on Saturday morning which lasted throughout the day; drank a lot of " fluids and dizziness has gotten a lot better.     MEDICATIONS: No current medications, ALLERGIES: NKDA  Clinician Response:  Dear Artie,  Based on the information provided, you have contact dermatitis. Contact dermatitis is a condition involving skin inflammation. This occurs after contact with a substance that irritates the skin or causes an allergic skin reaction.  The most common symptom is a red, itchy rash. The skin may also be dry or cracked. Occasionally, blisters develop and form a crust on the surface of the skin after bursting.  Medication information  I am prescribing:     Triamcinolone acetonide 0.1% topical cream. Apply a thin layer to the affected area 2 times a day. Wash hands before and after use. There are no refills with this prescription.   Unless you are allergic to the over-the-counter medication(s) below, I recommend using:   An antihistamine such as Zyrtec, Claritin, Allegra, or store brand during the day to reduce itching.   Over-the-counter medications do not require a prescription. Ask the pharmacist if you have any questions.  Self care  Steps you can take to be as comfortable as possible:     Avoid scratching the rash    Take a lukewarm bath to soothe the skin (adding colloidal oatmeal can help even more)    Apply a moisturizing lotion immediately after bathing and frequently reapply throughout the day    Apply a cool, wet washcloth to your rash for 15 minutes several times a day    Use mild soap and laundry detergent    Choose clothing and bedding made of a breathable material like cotton    Do not use antibiotic creams or ointments unless recommended by a  provider     When to seek care  Please make an appointment to be seen in a clinic or urgent care if any of the following occur:     You develop new symptoms or your symptoms become worse    Your rash hasn't improved after 14 days    Symptoms are so severe that you are unable to sleep or do regular activities    You have areas of broken  skin from scratching    You notice symptoms of a skin infection (e.g. Spreading redness, pain that is not improving, fever, warmth)      Diagnosis: Contact dermatitis  Diagnosis ICD: L25.9  Prescription: triamcinolone acetonide (Triderm) 0.1 % topical cream 1 30 gram tube, 14 days supply. Apply a thin layer to the affected area 2 times a day. Refills: 0, Refill as needed: no, Allow substitutions: yes  Pharmacy: [id number] - [phone] - [address] [place] [address]

## 2020-08-30 ENCOUNTER — OFFICE VISIT - HEALTHEAST (OUTPATIENT)
Dept: FAMILY MEDICINE | Facility: CLINIC | Age: 40
End: 2020-08-30

## 2020-08-30 ENCOUNTER — COMMUNICATION - HEALTHEAST (OUTPATIENT)
Dept: FAMILY MEDICINE | Facility: CLINIC | Age: 40
End: 2020-08-30

## 2020-08-30 DIAGNOSIS — R10.31 RIGHT GROIN PAIN: ICD-10-CM

## 2020-08-31 ENCOUNTER — AMBULATORY - HEALTHEAST (OUTPATIENT)
Dept: FAMILY MEDICINE | Facility: CLINIC | Age: 40
End: 2020-08-31

## 2020-08-31 ENCOUNTER — COMMUNICATION - HEALTHEAST (OUTPATIENT)
Dept: FAMILY MEDICINE | Facility: CLINIC | Age: 40
End: 2020-08-31

## 2020-08-31 ENCOUNTER — HOSPITAL ENCOUNTER (OUTPATIENT)
Dept: ULTRASOUND IMAGING | Facility: CLINIC | Age: 40
Discharge: HOME | End: 2020-08-31

## 2020-08-31 DIAGNOSIS — R10.31 RIGHT GROIN PAIN: ICD-10-CM

## 2020-08-31 DIAGNOSIS — K40.91 UNILATERAL RECURRENT INGUINAL HERNIA WITHOUT OBSTRUCTION OR GANGRENE: ICD-10-CM

## 2020-09-02 ENCOUNTER — OFFICE VISIT - HEALTHEAST (OUTPATIENT)
Dept: SURGERY | Facility: CLINIC | Age: 40
End: 2020-09-02

## 2020-09-02 DIAGNOSIS — M70.60 TROCHANTERIC BURSITIS, UNSPECIFIED LATERALITY: ICD-10-CM

## 2020-09-02 ASSESSMENT — MIFFLIN-ST. JEOR: SCORE: 1801.23

## 2020-09-03 ENCOUNTER — COMMUNICATION - HEALTHEAST (OUTPATIENT)
Dept: SURGERY | Facility: CLINIC | Age: 40
End: 2020-09-03

## 2020-09-03 ENCOUNTER — AMBULATORY - HEALTHEAST (OUTPATIENT)
Dept: SURGERY | Facility: HOSPITAL | Age: 40
End: 2020-09-03

## 2020-09-03 ENCOUNTER — COMMUNICATION - HEALTHEAST (OUTPATIENT)
Dept: FAMILY MEDICINE | Facility: CLINIC | Age: 40
End: 2020-09-03

## 2020-09-03 DIAGNOSIS — Z11.59 ENCOUNTER FOR SCREENING FOR OTHER VIRAL DISEASES: ICD-10-CM

## 2020-09-06 ENCOUNTER — AMBULATORY - HEALTHEAST (OUTPATIENT)
Dept: FAMILY MEDICINE | Facility: CLINIC | Age: 40
End: 2020-09-06

## 2020-09-06 DIAGNOSIS — Z11.59 ENCOUNTER FOR SCREENING FOR OTHER VIRAL DISEASES: ICD-10-CM

## 2020-09-08 ENCOUNTER — COMMUNICATION - HEALTHEAST (OUTPATIENT)
Dept: SCHEDULING | Facility: CLINIC | Age: 40
End: 2020-09-08

## 2020-09-10 ENCOUNTER — SURGERY - HEALTHEAST (OUTPATIENT)
Dept: SURGERY | Facility: HOSPITAL | Age: 40
End: 2020-09-10

## 2020-09-10 ENCOUNTER — ANESTHESIA - HEALTHEAST (OUTPATIENT)
Dept: SURGERY | Facility: HOSPITAL | Age: 40
End: 2020-09-10

## 2020-09-17 ENCOUNTER — AMBULATORY - HEALTHEAST (OUTPATIENT)
Dept: NURSING | Facility: CLINIC | Age: 40
End: 2020-09-17

## 2020-09-24 ENCOUNTER — OFFICE VISIT - HEALTHEAST (OUTPATIENT)
Dept: SURGERY | Facility: CLINIC | Age: 40
End: 2020-09-24

## 2020-09-24 DIAGNOSIS — K40.90 RIGHT INGUINAL HERNIA: ICD-10-CM

## 2020-10-02 ENCOUNTER — COMMUNICATION - HEALTHEAST (OUTPATIENT)
Dept: SURGERY | Facility: CLINIC | Age: 40
End: 2020-10-02

## 2020-10-02 ENCOUNTER — COMMUNICATION - HEALTHEAST (OUTPATIENT)
Dept: ADMINISTRATIVE | Facility: CLINIC | Age: 40
End: 2020-10-02

## 2020-10-03 ENCOUNTER — OFFICE VISIT - HEALTHEAST (OUTPATIENT)
Dept: FAMILY MEDICINE | Facility: CLINIC | Age: 40
End: 2020-10-03

## 2020-10-03 ENCOUNTER — HOSPITAL ENCOUNTER (OUTPATIENT)
Dept: ULTRASOUND IMAGING | Facility: CLINIC | Age: 40
Discharge: HOME | End: 2020-10-03

## 2020-10-03 DIAGNOSIS — K40.90 RIGHT INGUINAL HERNIA: ICD-10-CM

## 2020-10-03 DIAGNOSIS — R21 RASH: ICD-10-CM

## 2020-10-05 ENCOUNTER — COMMUNICATION - HEALTHEAST (OUTPATIENT)
Dept: SURGERY | Facility: CLINIC | Age: 40
End: 2020-10-05

## 2020-10-06 ENCOUNTER — OFFICE VISIT - HEALTHEAST (OUTPATIENT)
Dept: SURGERY | Facility: CLINIC | Age: 40
End: 2020-10-06

## 2020-10-06 DIAGNOSIS — K40.90 RIGHT INGUINAL HERNIA: ICD-10-CM

## 2020-10-07 ENCOUNTER — HOSPITAL ENCOUNTER (OUTPATIENT)
Dept: CT IMAGING | Facility: CLINIC | Age: 40
Discharge: HOME | End: 2020-10-07
Attending: SURGERY

## 2020-10-07 DIAGNOSIS — K40.90 RIGHT INGUINAL HERNIA: ICD-10-CM

## 2020-10-23 ENCOUNTER — OFFICE VISIT - HEALTHEAST (OUTPATIENT)
Dept: SURGERY | Facility: CLINIC | Age: 40
End: 2020-10-23

## 2020-10-23 DIAGNOSIS — K40.90 RIGHT INGUINAL HERNIA: ICD-10-CM

## 2020-10-28 ENCOUNTER — OFFICE VISIT - HEALTHEAST (OUTPATIENT)
Dept: PHYSICAL THERAPY | Facility: REHABILITATION | Age: 40
End: 2020-10-28

## 2020-10-28 DIAGNOSIS — R10.31 DEEP RIGHT INGUINAL PAIN: ICD-10-CM

## 2020-10-28 DIAGNOSIS — M25.551 PAIN IN JOINT INVOLVING RIGHT PELVIC REGION AND THIGH: ICD-10-CM

## 2020-10-28 DIAGNOSIS — K40.90 RIGHT INGUINAL HERNIA: ICD-10-CM

## 2021-02-23 ENCOUNTER — OFFICE VISIT - HEALTHEAST (OUTPATIENT)
Dept: FAMILY MEDICINE | Facility: CLINIC | Age: 41
End: 2021-02-23

## 2021-02-23 DIAGNOSIS — Z00.00 ENCOUNTER FOR GENERAL ADULT MEDICAL EXAMINATION WITHOUT ABNORMAL FINDINGS: ICD-10-CM

## 2021-02-23 DIAGNOSIS — Z13.220 ENCOUNTER FOR SCREENING FOR LIPOID DISORDERS: ICD-10-CM

## 2021-02-23 LAB
ALBUMIN SERPL-MCNC: 4.3 G/DL (ref 3.5–5)
ALP SERPL-CCNC: 66 U/L (ref 45–120)
ALT SERPL W P-5'-P-CCNC: 19 U/L (ref 0–45)
ANION GAP SERPL CALCULATED.3IONS-SCNC: 9 MMOL/L (ref 5–18)
AST SERPL W P-5'-P-CCNC: 17 U/L (ref 0–40)
BILIRUB SERPL-MCNC: 0.6 MG/DL (ref 0–1)
BUN SERPL-MCNC: 17 MG/DL (ref 8–22)
CALCIUM SERPL-MCNC: 9.5 MG/DL (ref 8.5–10.5)
CHLORIDE BLD-SCNC: 105 MMOL/L (ref 98–107)
CHOLEST SERPL-MCNC: 196 MG/DL
CO2 SERPL-SCNC: 28 MMOL/L (ref 22–31)
CREAT SERPL-MCNC: 0.89 MG/DL (ref 0.7–1.3)
FASTING STATUS PATIENT QL REPORTED: YES
GFR SERPL CREATININE-BSD FRML MDRD: >60 ML/MIN/1.73M2
GLUCOSE BLD-MCNC: 96 MG/DL (ref 70–125)
HDLC SERPL-MCNC: 59 MG/DL
HGB BLD-MCNC: 16.2 G/DL (ref 14–18)
LDLC SERPL CALC-MCNC: 116 MG/DL
POTASSIUM BLD-SCNC: 4.7 MMOL/L (ref 3.5–5)
PROT SERPL-MCNC: 7.4 G/DL (ref 6–8)
SODIUM SERPL-SCNC: 142 MMOL/L (ref 136–145)
TRIGL SERPL-MCNC: 105 MG/DL

## 2021-02-23 ASSESSMENT — MIFFLIN-ST. JEOR: SCORE: 1773.67

## 2021-02-24 LAB
25(OH)D3 SERPL-MCNC: 26 NG/ML (ref 30–80)
25(OH)D3 SERPL-MCNC: 26 NG/ML (ref 30–80)

## 2021-03-01 ENCOUNTER — RECORDS - HEALTHEAST (OUTPATIENT)
Dept: ADMINISTRATIVE | Facility: OTHER | Age: 41
End: 2021-03-01

## 2021-03-08 ENCOUNTER — RECORDS - HEALTHEAST (OUTPATIENT)
Dept: ADMINISTRATIVE | Facility: OTHER | Age: 41
End: 2021-03-08

## 2021-03-26 ENCOUNTER — OFFICE VISIT - HEALTHEAST (OUTPATIENT)
Dept: FAMILY MEDICINE | Facility: CLINIC | Age: 41
End: 2021-03-26

## 2021-03-26 ENCOUNTER — COMMUNICATION - HEALTHEAST (OUTPATIENT)
Dept: FAMILY MEDICINE | Facility: CLINIC | Age: 41
End: 2021-03-26

## 2021-03-26 DIAGNOSIS — R12 HEARTBURN: ICD-10-CM

## 2021-03-26 DIAGNOSIS — K29.00 ACUTE GASTRITIS WITHOUT HEMORRHAGE, UNSPECIFIED GASTRITIS TYPE: ICD-10-CM

## 2021-03-29 ENCOUNTER — RECORDS - HEALTHEAST (OUTPATIENT)
Dept: ADMINISTRATIVE | Facility: OTHER | Age: 41
End: 2021-03-29

## 2021-03-31 ENCOUNTER — OFFICE VISIT - HEALTHEAST (OUTPATIENT)
Dept: FAMILY MEDICINE | Facility: CLINIC | Age: 41
End: 2021-03-31

## 2021-03-31 ENCOUNTER — AMBULATORY - HEALTHEAST (OUTPATIENT)
Dept: NURSING | Facility: CLINIC | Age: 41
End: 2021-03-31

## 2021-03-31 DIAGNOSIS — F43.22 ADJUSTMENT DISORDER WITH ANXIOUS MOOD: ICD-10-CM

## 2021-03-31 DIAGNOSIS — K21.00 GASTROESOPHAGEAL REFLUX DISEASE WITH ESOPHAGITIS WITHOUT HEMORRHAGE: ICD-10-CM

## 2021-04-10 ENCOUNTER — COMMUNICATION - HEALTHEAST (OUTPATIENT)
Dept: FAMILY MEDICINE | Facility: CLINIC | Age: 41
End: 2021-04-10

## 2021-04-10 DIAGNOSIS — K21.00 GASTROESOPHAGEAL REFLUX DISEASE WITH ESOPHAGITIS WITHOUT HEMORRHAGE: ICD-10-CM

## 2021-04-12 ENCOUNTER — VIRTUAL VISIT (OUTPATIENT)
Dept: PSYCHOLOGY | Facility: CLINIC | Age: 41
End: 2021-04-12
Payer: COMMERCIAL

## 2021-04-12 DIAGNOSIS — F32.0 MAJOR DEPRESSIVE DISORDER, SINGLE EPISODE, MILD (H): Primary | ICD-10-CM

## 2021-04-12 DIAGNOSIS — F41.9 ANXIETY DISORDER, UNSPECIFIED TYPE: ICD-10-CM

## 2021-04-12 PROCEDURE — 90834 PSYTX W PT 45 MINUTES: CPT | Mod: 95

## 2021-04-12 ASSESSMENT — COLUMBIA-SUICIDE SEVERITY RATING SCALE - C-SSRS
TOTAL  NUMBER OF INTERRUPTED ATTEMPTS LIFETIME: NO
4. HAVE YOU HAD THESE THOUGHTS AND HAD SOME INTENTION OF ACTING ON THEM?: NO
5. HAVE YOU STARTED TO WORK OUT OR WORKED OUT THE DETAILS OF HOW TO KILL YOURSELF? DO YOU INTEND TO CARRY OUT THIS PLAN?: NO
TOTAL  NUMBER OF INTERRUPTED ATTEMPTS PAST 3 MONTHS: NO
1. IN THE PAST MONTH, HAVE YOU WISHED YOU WERE DEAD OR WISHED YOU COULD GO TO SLEEP AND NOT WAKE UP?: NO
6. HAVE YOU EVER DONE ANYTHING, STARTED TO DO ANYTHING, OR PREPARED TO DO ANYTHING TO END YOUR LIFE?: NO
3. HAVE YOU BEEN THINKING ABOUT HOW YOU MIGHT KILL YOURSELF?: NO
TOTAL  NUMBER OF ABORTED OR SELF INTERRUPTED ATTEMPTS PAST 3 MONTHS: NO
ATTEMPT PAST THREE MONTHS: NO
5. HAVE YOU STARTED TO WORK OUT OR WORKED OUT THE DETAILS OF HOW TO KILL YOURSELF? DO YOU INTEND TO CARRY OUT THIS PLAN?: NO
4. HAVE YOU HAD THESE THOUGHTS AND HAD SOME INTENTION OF ACTING ON THEM?: NO
6. HAVE YOU EVER DONE ANYTHING, STARTED TO DO ANYTHING, OR PREPARED TO DO ANYTHING TO END YOUR LIFE?: NO
2. HAVE YOU ACTUALLY HAD ANY THOUGHTS OF KILLING YOURSELF LIFETIME?: NO
TOTAL  NUMBER OF ABORTED OR SELF INTERRUPTED ATTEMPTS PAST LIFETIME: NO
2. HAVE YOU ACTUALLY HAD ANY THOUGHTS OF KILLING YOURSELF?: NO
ATTEMPT LIFETIME: NO
1. IN THE PAST MONTH, HAVE YOU WISHED YOU WERE DEAD OR WISHED YOU COULD GO TO SLEEP AND NOT WAKE UP?: NO

## 2021-04-12 ASSESSMENT — ANXIETY QUESTIONNAIRES
GAD7 TOTAL SCORE: 14
7. FEELING AFRAID AS IF SOMETHING AWFUL MIGHT HAPPEN: SEVERAL DAYS
GAD7 TOTAL SCORE: 14
GAD7 TOTAL SCORE: 14
2. NOT BEING ABLE TO STOP OR CONTROL WORRYING: NEARLY EVERY DAY
4. TROUBLE RELAXING: MORE THAN HALF THE DAYS
1. FEELING NERVOUS, ANXIOUS, OR ON EDGE: MORE THAN HALF THE DAYS
7. FEELING AFRAID AS IF SOMETHING AWFUL MIGHT HAPPEN: SEVERAL DAYS
5. BEING SO RESTLESS THAT IT IS HARD TO SIT STILL: MORE THAN HALF THE DAYS
3. WORRYING TOO MUCH ABOUT DIFFERENT THINGS: NEARLY EVERY DAY
6. BECOMING EASILY ANNOYED OR IRRITABLE: SEVERAL DAYS

## 2021-04-12 ASSESSMENT — PATIENT HEALTH QUESTIONNAIRE - PHQ9
SUM OF ALL RESPONSES TO PHQ QUESTIONS 1-9: 10
SUM OF ALL RESPONSES TO PHQ QUESTIONS 1-9: 10
10. IF YOU CHECKED OFF ANY PROBLEMS, HOW DIFFICULT HAVE THESE PROBLEMS MADE IT FOR YOU TO DO YOUR WORK, TAKE CARE OF THINGS AT HOME, OR GET ALONG WITH OTHER PEOPLE: NOT DIFFICULT AT ALL

## 2021-04-12 NOTE — PROGRESS NOTES
Progress Note - Initial Visit    Client Name:  Artie Davis Date: 4/12/21         Service Type: Individual     Visit Start Time: 12:30pm  Visit End Time: 1:20pm    Visit #: 1    Attendees: Client    Service Modality:  Video Visit:      Provider verified identity through the following two step process.  Patient provided:  Patient address    Telemedicine Visit: The patient's condition can be safely assessed and treated via synchronous audio and visual telemedicine encounter.      Reason for Telemedicine Visit: Services only offered telehealth    Originating Site (Patient Location): Patient's home    Distant Site (Provider Location): Provider Remote Setting    Consent:  The patient/guardian has verbally consented to: the potential risks and benefits of telemedicine (video visit) versus in person care; bill my insurance or make self-payment for services provided; and responsibility for payment of non-covered services.     Patient would like the video invitation sent by:  Send to e-mail at: [email or web address]    Mode of Communication:  Video Conference via [employer]well    As the provider I attest to compliance with applicable laws and regulations related to telemedicine.       DATA:   Interactive Complexity: No   Crisis: No     Presenting Concerns/  Current Stressors:   Increase in anxiety and stress which is manifesting in physical health concerns and complications.      ASSESSMENT:  Mental Status Assessment:  Appearance:   Appropriate   Eye Contact:   Good   Psychomotor Behavior: Normal   Attitude:   Cooperative  Interested Friendly  Orientation:   All  Speech   Rate / Production: Normal/ Responsive   Volume:  Normal   Mood:    Normal  Affect:    Appropriate   Thought Content:  Clear   Thought Form:  Coherent  Goal Directed  Logical   Insight:    Fair       Safety Issues and Plan for Safety and Risk Management:     [place] Suicide Severity Rating Scale (Lifetime/Recent)  [place] Suicide Severity Rating  (Lifetime/Recent) 4/12/2021   1. Wish to be Dead (Lifetime) No   1. Wish to be Dead (Recent) No   2. Non-Specific Active Suicidal Thoughts (Lifetime) No   2. Non-Specific Active Suicidal Thoughts (Recent) No   3. Active Suicidal Ideation with any Methods (Not Plan) Without Intent to Act (Lifetime) No   3. Active Suicidal Ideation with any Methods (Not Plan) Without Intent to Act (Recent) No   4. Active Suicidal Ideation with Some Intent to Act, Without Specific Plan (Lifetime) No   4. Active Suicidal Ideation with Some Intent to Act, Without Specific Plan (Recent) No   5. Active Suicidal Ideation with Specific Plan and Intent (Lifetime) No   5. Active Suicidal Ideation with Specific Plan and Intent (Recent) No   Most Severe Ideation Rating (Lifetime) NA   Frequency (Lifetime) NA   Duration (Lifetime) NA   Controllability (Lifetime) NA   Protective Factors  (Lifetime) NA   Reasons for Ideation (Lifetime) NA   Most Severe Ideation Rating (Past Month) NA   Frequency (Past Month) NA   Duration (Past Month) NA   Controllability (Past Month) NA   Protective Factors (Past Month) NA   Reasons for Ideation (Past Month) NA   Actual Attempt (Lifetime) No   Actual Attempt (Past 3 Months) No   Has subject engaged in non-suicidal self-injurious behavior? (Lifetime) No   Has subject engaged in non-suicidal self-injurious behavior? (Past 3 Months) No   Interrupted Attempts (Lifetime) No   Interrupted Attempts (Past 3 Months) No   Aborted or Self-Interrupted Attempt (Lifetime) No   Aborted or Self-Interrupted Attempt (Past 3 Months) No   Preparatory Acts or Behavior (Lifetime) No   Preparatory Acts or Behavior (Past 3 Months) No   Most Recent Attempt Actual Lethality Code NA   Most Lethal Attempt Actual Lethality Code NA   Initial/First Attempt Actual Lethality Code NA     Patient denies current fears or concerns for personal safety.  Patient denies current or recent suicidal ideation or behaviors.  Patient denies current or recent  homicidal ideation or behaviors.  Patient denies current or recent self injurious behavior or ideation.  Patient denies other safety concerns.  Recommended that patient call 911 or go to the local ED should there be a change in any of these risk factors.  Patient reports there are did not assess.  Provider will assess at next session..     Diagnostic Criteria:  Mixed anxiety-depressive disorder: clinically significant symptoms of anxiety and depression, but the criteria are not met for either a specific Mood Disorder or a specific Anxiety Disorder.  The client does not report enough symptoms for the full criteria of any specific Anxiety Disorder to have been met  Anxiety disorder is present, but at this time therapist is unable to determine whether it is primary.  Further assessment needed.   - Excessive anxiety and worry about a number of events or activities (such as work or school performance).    - The person finds it difficult to control the worry.   - Restlessness or feeling keyed up or on edge.    - Being easily fatigued.    - Irritability.    - Sleep disturbance (difficulty falling or staying asleep, or restless unsatisfying sleep).    - The focus of the anxiety and worry is not confined to features of an Axis I disorder.   - The anxiety, worry, or physical symptoms cause clinically significant distress or impairment in social, occupational, or other important areas of functioning.    - The disturbance is not due to the direct physiological effects of a substance (e.g., a drug of abuse, a medication) or a general medical condition (e.g., hyperthyroidism) and does not occur exclusively during a Mood Disorder, a Psychotic Disorder, or a Pervasive Developmental Disorder.  A) Single episode - symptoms have been present during the same 2-week period and represent a change from previous functioning 5 or more symptoms (required for diagnosis)   - Depressed mood. Note: In children and adolescents, can be irritable  mood.     - Diminished interest or pleasure in all, or almost all, activities.    - Fatigue or loss of energy.    - Feelings of worthlessness or inappropriate and excessive guilt.    - Diminished ability to think or concentrate, or indecisiveness.   B) The symptoms cause clinically significant distress or impairment in social, occupational, or other important areas of functioning  C) The episode is not attributable to the physiological effects of a substance or to another medical condition  D) The occurence of major depressive episode is not better explained by other thought / psychotic disorders  E) There has never been a manic episode or hypomanic episode      DSM5 Diagnoses: (Sustained by DSM5 Criteria Listed Above)  Diagnoses: 296.21 (F32.0) Major Depressive Disorder, Single Episode, Mild _  300.00 (F41.9) Unspecified Anxiety Disorder  Psychosocial & Contextual Factors: Artie is a , 40 year old, , male.  He and his wife have a young daughter with a heart condition.  COVID contributed to increased isolation and childcare at home to support his daughter's safety.  He and his wife both work full-time and have stressful jobs.  A combination of a history of anxiety, life and work stress are impacting Artie's physical health as well as his mental health.  WHODAS 2.0 (12 item): No flowsheet data found.  Will be completed at next session.  Intervention:   Psychodynamic- Patient processed internal experiences   Collateral Reports Completed:  Not Applicable - referring provider in [facility], unable to route notes at this time.      PLAN: (Homework, other):  1. Provider will continue Diagnostic Assessment.        Ramona Valentine, [facility]  April 12, 2021  Reviewed and signed by Man Umana [facility] April 15, 2021        Answers for HPI/ROS submitted by the patient on 4/12/2021   If you checked off any problems, how difficult have these problems made it for you to do your work, take care of  things at home, or get along with other people?: Not difficult at all  PHQ9 TOTAL SCORE: 10  MC 7 TOTAL SCORE: 14

## 2021-04-13 ASSESSMENT — PATIENT HEALTH QUESTIONNAIRE - PHQ9: SUM OF ALL RESPONSES TO PHQ QUESTIONS 1-9: 10

## 2021-04-13 ASSESSMENT — ANXIETY QUESTIONNAIRES: GAD7 TOTAL SCORE: 14

## 2021-04-14 ENCOUNTER — RECORDS - HEALTHEAST (OUTPATIENT)
Dept: ADMINISTRATIVE | Facility: OTHER | Age: 41
End: 2021-04-14

## 2021-04-18 ENCOUNTER — HEALTH MAINTENANCE LETTER (OUTPATIENT)
Age: 41
End: 2021-04-18

## 2021-04-21 ENCOUNTER — COMMUNICATION - HEALTHEAST (OUTPATIENT)
Dept: FAMILY MEDICINE | Facility: CLINIC | Age: 41
End: 2021-04-21

## 2021-04-21 ENCOUNTER — AMBULATORY - HEALTHEAST (OUTPATIENT)
Dept: NURSING | Facility: CLINIC | Age: 41
End: 2021-04-21

## 2021-04-21 DIAGNOSIS — K21.00 GASTROESOPHAGEAL REFLUX DISEASE WITH ESOPHAGITIS WITHOUT HEMORRHAGE: ICD-10-CM

## 2021-04-21 DIAGNOSIS — F43.22 ADJUSTMENT DISORDER WITH ANXIOUS MOOD: ICD-10-CM

## 2021-05-03 ENCOUNTER — VIRTUAL VISIT (OUTPATIENT)
Dept: PSYCHOLOGY | Facility: CLINIC | Age: 41
End: 2021-05-03
Payer: COMMERCIAL

## 2021-05-03 DIAGNOSIS — F32.0 MAJOR DEPRESSIVE DISORDER, SINGLE EPISODE, MILD (H): Primary | ICD-10-CM

## 2021-05-03 DIAGNOSIS — F41.9 ANXIETY DISORDER, UNSPECIFIED TYPE: ICD-10-CM

## 2021-05-03 PROCEDURE — 90791 PSYCH DIAGNOSTIC EVALUATION: CPT | Mod: GT

## 2021-05-10 ENCOUNTER — VIRTUAL VISIT (OUTPATIENT)
Dept: PSYCHOLOGY | Facility: CLINIC | Age: 41
End: 2021-05-10
Payer: COMMERCIAL

## 2021-05-10 DIAGNOSIS — F41.1 GAD (GENERALIZED ANXIETY DISORDER): Primary | ICD-10-CM

## 2021-05-10 DIAGNOSIS — F32.0 MAJOR DEPRESSIVE DISORDER, SINGLE EPISODE, MILD (H): ICD-10-CM

## 2021-05-10 PROCEDURE — 90834 PSYTX W PT 45 MINUTES: CPT | Mod: GT

## 2021-05-10 ASSESSMENT — PATIENT HEALTH QUESTIONNAIRE - PHQ9
10. IF YOU CHECKED OFF ANY PROBLEMS, HOW DIFFICULT HAVE THESE PROBLEMS MADE IT FOR YOU TO DO YOUR WORK, TAKE CARE OF THINGS AT HOME, OR GET ALONG WITH OTHER PEOPLE: SOMEWHAT DIFFICULT
SUM OF ALL RESPONSES TO PHQ QUESTIONS 1-9: 10
SUM OF ALL RESPONSES TO PHQ QUESTIONS 1-9: 10

## 2021-05-10 ASSESSMENT — ANXIETY QUESTIONNAIRES
5. BEING SO RESTLESS THAT IT IS HARD TO SIT STILL: NEARLY EVERY DAY
7. FEELING AFRAID AS IF SOMETHING AWFUL MIGHT HAPPEN: NOT AT ALL
1. FEELING NERVOUS, ANXIOUS, OR ON EDGE: NEARLY EVERY DAY
6. BECOMING EASILY ANNOYED OR IRRITABLE: MORE THAN HALF THE DAYS
3. WORRYING TOO MUCH ABOUT DIFFERENT THINGS: NEARLY EVERY DAY
2. NOT BEING ABLE TO STOP OR CONTROL WORRYING: NEARLY EVERY DAY
7. FEELING AFRAID AS IF SOMETHING AWFUL MIGHT HAPPEN: NOT AT ALL
4. TROUBLE RELAXING: NEARLY EVERY DAY
GAD7 TOTAL SCORE: 17

## 2021-05-10 NOTE — PROGRESS NOTES
Progress Note    Patient Name: Artie Daivs  Date: 5/10/21         Service Type: Individual      Session Start Time: 11:00am  Session End Time: 11:45am     Session Length: 45 minutes    Session #: 3    Attendees: Client    Service Modality:  Video Visit:      Provider verified identity through the following two step process.  Patient provided:  Patient is known previously to provider    Telemedicine Visit: The patient's condition can be safely assessed and treated via synchronous audio and visual telemedicine encounter.      Reason for Telemedicine Visit: Services only offered telehealth    Originating Site (Patient Location): Patient's home    Distant Site (Provider Location): Provider Remote Setting    Consent:  The patient/guardian has verbally consented to: the potential risks and benefits of telemedicine (video visit) versus in person care; bill my insurance or make self-payment for services provided; and responsibility for payment of non-covered services.     Patient would like the video invitation sent by:  Send to e-mail at: [email or web address]    Mode of Communication:  Video Conference via Amwell    As the provider I attest to compliance with applicable laws and regulations related to telemedicine.     Treatment Plan Last Reviewed: 5/10/21  PHQ-9 / MC-7 : 4/12/21    DATA  Interactive Complexity: No  Crisis: No       Progress Since Last Session (Related to Symptoms / Goals / Homework):   Symptoms: Worsening Artie reports an increase in his symptoms of anxiety and stress since our last session.    Homework: Achieved / completed to satisfaction      Episode of Care Goals: Satisfactory progress - CONTEMPLATION (Considering change and yet undecided); Intervened by assessing the negative and positive thinking (ambivalence) about behavior change     Current / Ongoing Stressors and Concerns:   Artie has a history of anxiety since college.  Death of younger brother in  teen years impacted Artie and family dynamics.  He has had high expectations for himself since adolescence.  His stress level has increased in the past 5 years with marriage, child raising, work stress for him and his wife.  Artie pushes himself to manage more and more to support his family which is taking a significant toll on him physically and emotionally.    Treatment plan created in session.     Treatment Objective(s) Addressed in This Session:   identify 1 sleep hygiene practices  A-Z sleep strategy     Intervention:   Emotion Focused Therapy: Validated the volume of tasks and stress Artie is managing which contributes to physical and mental health concerns.  Challenges assertively expressing his needs and setting boundaries.  Tools to support management of anxiety as well as self-care are the focus of the treatment plan.        ASSESSMENT: Current Emotional / Mental Status (status of significant symptoms):   Risk status (Self / Other harm or suicidal ideation)   Patient denies current fears or concerns for personal safety.   Patient denies current or recent suicidal ideation or behaviors.   Patient denies current or recent homicidal ideation or behaviors.   Patient denies current or recent self injurious behavior or ideation.   Patient denies other safety concerns.   Patient reports there has been no change in risk factors since their last session.     Patient reports there has been no change in protective factors since their last session.     Recommended that patient call 911 or go to the local ED should there be a change in any of these risk factors.     Appearance:   Appropriate    Eye Contact:   Good    Psychomotor Behavior: Normal    Attitude:   Cooperative  Interested Friendly   Orientation:   All   Speech    Rate / Production: Talkative Normal     Volume:  Normal    Mood:    Anxious  Normal   Affect:    Appropriate    Thought Content:  Clear  Rumination    Thought Form:  Coherent  Goal Directed  Logical     Insight:    Fair      Medication Review:   No current psychiatric medications prescribed     Medication Compliance:   NA     Changes in Health Issues:   Yes: stomach, Associated Psychological Distress     Chemical Use Review:   Substance Use: Chemical use reviewed, no active concerns identified      Tobacco Use: No current tobacco use.      Diagnosis:  1. MC (generalized anxiety disorder)    2. Major depressive disorder, single episode, mild (H)        Collateral Reports Completed:   Not Applicable    PLAN: (Patient Tasks / Therapist Tasks / Other)  1.  Treatment plan created in session and sent via [employer].  2.  A-Z sleep strategy: tire your brain with an activity - A-Z think of something that starts with each letter in a different category (I.e. countries, foods, songs, movies)        Ramona Valentine, [facility] May 10, 2021  Reviewed and signed by Man Umana [facility] may 12/20/2021                                                       ______________________________________________________________________    Treatment Plan    Patient's Name: Artie Davis  YOB: 1980    Date: 5/10/21    DSM5 Diagnoses: 300.02 (F41.1) Generalized Anxiety Disorder  Psychosocial / Contextual Factors: Historical anxiety symptoms which are increased due to COVID-19, family stress, work stress, relocation  WHODAS:     Referral / Collaboration:  Referral to another professional/service is not indicated at this time..    Anticipated number of session or this episode of care: 12    MeasurableTreatment Goal(s) related to diagnosis / functional impairment(s)  Goal 1: Patient will reduce intensity and frequency of symptoms of anxiety and increased use of tools and manage anxiety.    I will know I've met my goal when I have time to myself and I don't think about the next thing I have to do, my mind isn't racing.      Objective #A (Patient Action)    Patient will use at least 2 coping skills for anxiety management in the  next 12 weeks.  Status: New - Date: 5/10/21     Intervention(s)  Therapist will assign homework to practice coping strategies and discuss efficacy of tools in session  provide educational materials on CBT pattern of behavior, deep breathing, exercise, thought restructuring, self-care.      Goal 2: Patient will improve interpersonal effectiveness in relationships.    I will know I've met my goal when we enjoy our time together, doing more things we want to do.      Objective #A (Patient Action)    Status: New - Date: 5/10/21     Patient will learn & utilize at least 1 assertive communication skills weekly.    Intervention(s)  Therapist will assign homework to practice assertive communication and discuss efficacy in session  teach assertiveness skills. [place], FAST, GIVE, assertive versus passive or aggressive communication, unmet expectations.    Objective #B  Patient will practice setting boundaries 1 times in the next 12 weeks.    Status: New - Date: 5/10/21     Intervention(s)  Therapist will assign homework practice setting boundaries in relationships and discuss efficacy in session  teach about healthy boundaries. and strategies to support healthy personal boundaries personally and professionally.      Patient has reviewed and agreed to the above plan.      Ramona Valentine, [facility]  May 10, 2021  Reviewed and signed by Man Umana [facility] May 12, 2021

## 2021-05-10 NOTE — PATIENT INSTRUCTIONS
PLAN: (Patient Tasks / Therapist Tasks / Other)  1.  Treatment plan created in session and sent via [employer].  2.  A-Z sleep strategy: tire your brain with an activity - A-Z think of something that starts with each letter in a different category (I.e. countries, foods, songs, movies)    Sleep strategies:    Sleeping - consistent bedtime will increase your body's melatonin production at the right time to initiate sleep.      Caffeine, stop at least 7 hours before bedtime    Breathing, use mindful breathing or 4-7-8 breathing as relaxing tool to get to sleep    Tire your brain with an activity - A-Z think of something that starts with each letter in a different category (I.e. countries, foods, songs, movies)    Blue light from electronics is activating to the brain.  Try to stop using electronics 30-60 minutes before sleep.     If you wake up read instead of using electronics (reading, scrolling, web surfing).      Mindful breathing    Start by taking a few deep breaths in and slowly exhaling.    Focus on the breath as you breathe deeply in and out.    It can be helpful to note  in  and  out  as you focus on your breathing going in and out.    Your mind with likely wander, this is ok.  It is natural for our minds to wander.    Bring your attention back to breathing and continue to focus on your breath.    Breathing for 30 seconds to 1 minute, more if you are able.    1 minute of daily meditation (this exercise counts) has been shown to be helpful in reducing anxiety and increasing focus.    The 4-7-8 breathing exercise is utterly simple, takes almost no time, requires no equipment and can be done anywhere. Although you can do the exercise in any position, sit with your back straight while learning the exercise.    Exhale completely through your mouth, making a whoosh sound.    Close your mouth and inhale quietly through your nose to a mental count of four.    Hold your breath for a count of seven.    Exhale completely  through your mouth, making a whoosh sound to a count of eight.    This is one breath. Now inhale again and repeat the cycle three more times for a total of four breaths.    Note that with this breathing technique, you always inhale quietly through your nose and exhale audibly through your mouth. Exhalation takes twice as long as inhalation. The absolute time you spend on each phase is not important; the ratio of 4:7:8 is important. If you have trouble holding your breath, speed the exercise up but keep to the ratio of 4:7:8 for the three phases. With practice you can slow it all down and get used to inhaling and exhaling more and more deeply.  This breathing exercise is a natural tranquilizer for the nervous system. Unlike tranquilizing drugs, which are often effective when you first take them but then lose their power over time, this exercise is subtle when you first try it, but gains in power with repetition and practice. Do it at least twice a day. You cannot do it too frequently.   Once you develop this technique by practicing it every day, it will be a very useful tool that you will always have with you. Use it whenever anything upsetting happens - before you react. Use it whenever you are aware of internal tension or stress. Use it to help you fall asleep. This exercise cannot be recommended too highly. Everyone can benefit from it.  Pasted from <https://www.[employer]we[employer].com/health-wellness/body-mind-spirit/stress-anxiety/breathing-three-exercises/>     ______________________________________________________________________    Treatment Plan    Patient's Name: Artie Davis  YOB: 1980    Date: 5/10/21    DSM5 Diagnoses: 300.02 (F41.1) Generalized Anxiety Disorder  Psychosocial / Contextual Factors: Historical anxiety symptoms which are increased due to COVID-19, family stress, work stress, relocation  WHODAS:     Referral / Collaboration:  Referral to another professional/service is not indicated at  this time..    Anticipated number of session or this episode of care: 12    MeasurableTreatment Goal(s) related to diagnosis / functional impairment(s)  Goal 1: Patient will reduce intensity and frequency of symptoms of anxiety and increased use of tools and manage anxiety.    I will know I've met my goal when I have time to myself and I don't think about the next thing I have to do, my mind isn't racing.      Objective #A (Patient Action)    Patient will use at least 2 coping skills for anxiety management in the next 12 weeks.  Status: New - Date: 5/10/21     Intervention(s)  Therapist will assign homework to practice coping strategies and discuss efficacy of tools in session  provide educational materials on CBT pattern of behavior, deep breathing, exercise, thought restructuring, self-care.      Goal 2: Patient will improve interpersonal effectiveness in relationships.    I will know I've met my goal when we enjoy our time together, doing more things we want to do.      Objective #A (Patient Action)    Status: New - Date: 5/10/21     Patient will learn & utilize at least 1 assertive communication skills weekly.    Intervention(s)  Therapist will assign homework to practice assertive communication and discuss efficacy in session  teach assertiveness skills. AZU[place]NA, FAST, GIVE, assertive versus passive or aggressive communication, unmet expectations.    Objective #B  Patient will practice setting boundaries 1 times in the next 12 weeks.    Status: New - Date: 5/10/21     Intervention(s)  Therapist will assign homework practice setting boundaries in relationships and discuss efficacy in session  teach about healthy boundaries. and strategies to support healthy personal boundaries personally and professionally.      Patient has reviewed and agreed to the above plan.      Ramona Valentine, PETE  May 10, 2021

## 2021-05-11 ASSESSMENT — PATIENT HEALTH QUESTIONNAIRE - PHQ9: SUM OF ALL RESPONSES TO PHQ QUESTIONS 1-9: 10

## 2021-05-11 ASSESSMENT — ANXIETY QUESTIONNAIRES: GAD7 TOTAL SCORE: 17

## 2021-05-17 ENCOUNTER — VIRTUAL VISIT (OUTPATIENT)
Dept: PSYCHOLOGY | Facility: CLINIC | Age: 41
End: 2021-05-17
Payer: COMMERCIAL

## 2021-05-17 DIAGNOSIS — F32.0 MAJOR DEPRESSIVE DISORDER, SINGLE EPISODE, MILD (H): ICD-10-CM

## 2021-05-17 DIAGNOSIS — F41.1 GAD (GENERALIZED ANXIETY DISORDER): Primary | ICD-10-CM

## 2021-05-17 PROCEDURE — 90834 PSYTX W PT 45 MINUTES: CPT | Mod: 95

## 2021-05-17 NOTE — PROGRESS NOTES
Progress Note    Patient Name: Artie Davis  Date: 5/17/21         Service Type: Individual      Session Start Time: 11:00am  Session End Time: 11:45am     Session Length: 45 minutes    Session #: 4    Attendees: Client    Service Modality:  Video Visit:      Provider verified identity through the following two step process.  Patient provided:  Patient is known previously to provider    Telemedicine Visit: The patient's condition can be safely assessed and treated via synchronous audio and visual telemedicine encounter.      Reason for Telemedicine Visit: Services only offered telehealth    Originating Site (Patient Location): Patient's home    Distant Site (Provider Location): Provider Remote Setting    Consent:  The patient/guardian has verbally consented to: the potential risks and benefits of telemedicine (video visit) versus in person care; bill my insurance or make self-payment for services provided; and responsibility for payment of non-covered services.     Patient would like the video invitation sent by:  Send to e-mail at: [email or web address]    Mode of Communication:  Video Conference via [employer]    As the provider I attest to compliance with applicable laws and regulations related to telemedicine.     Treatment Plan Last Reviewed: 5/10/21  PHQ-9 / MC-7 : Answers for HPI/ROS submitted by the patient on 5/10/2021   If you checked off any problems, how difficult have these problems made it for you to do your work, take care of things at home, or get along with other people?: Somewhat difficult  PHQ9 TOTAL SCORE: 10  MC 7 TOTAL SCORE: 17      DATA  Interactive Complexity: No  Crisis: No       Progress Since Last Session (Related to Symptoms / Goals / Homework):   Symptoms: Improving Artie reports a slight reduction in his symptoms of anxiety and depression since our last session.    Homework: Achieved / completed to satisfaction. Used sleep strategies, increased  self-care      Episode of Care Goals: Satisfactory progress - CONTEMPLATION (Considering change and yet undecided); Intervened by assessing the negative and positive thinking (ambivalence) about behavior change     Current / Ongoing Stressors and Concerns:   Artie has a history of anxiety since college.  Death of younger brother in teen years impacted Artie and family dynamics.  He has had high expectations for himself since adolescence.  His stress level has increased in the past 5 years with marriage, child raising, work stress for him and his wife.  Artie pushes himself to manage more and more to support his family which is taking a significant toll on him physically and emotionally.    Progress with self-care and movement since our last session. Continued stress related to tasks related to relocation, work, health concerns for family due to mask mandate change.     Treatment Objective(s) Addressed in This Session:   Feel less tired and more energy during the day   Identify negative self-talk and behaviors: challenge core beliefs, myths, and actions  identify 1 sleep hygiene practices  A-Z sleep strategy / increased mindfulness and awareness     Intervention:   Emotion Focused Therapy: Validated the volume of tasks and stress Artie is managing which contributes to physical and mental health concerns.  Artie used tools discussed in last session to improve his sleep hygiene and engage in self-care.  Artie expressed awareness of his stress level which increases his physical health concerns - he is actively working to be present, set boundaries and shift responsibilities at home and work to support himself.    Increased attention to patterns of thinking and behaving to create awareness and opportunities for change.        ASSESSMENT: Current Emotional / Mental Status (status of significant symptoms):   Risk status (Self / Other harm or suicidal ideation)   Patient denies current fears or concerns for personal  safety.   Patient denies current or recent suicidal ideation or behaviors.   Patient denies current or recent homicidal ideation or behaviors.   Patient denies current or recent self injurious behavior or ideation.   Patient denies other safety concerns.   Patient reports there has been no change in risk factors since their last session.     Patient reports there has been no change in protective factors since their last session.     Recommended that patient call 911 or go to the local ED should there be a change in any of these risk factors.     Appearance:   Appropriate    Eye Contact:   Good    Psychomotor Behavior: Normal    Attitude:   Cooperative  Interested Friendly   Orientation:   All   Speech    Rate / Production: Talkative Normal     Volume:  Normal    Mood:    Anxious  Normal   Affect:    Appropriate    Thought Content:  Clear  Rumination    Thought Form:  Coherent  Goal Directed  Logical    Insight:    Fair      Medication Review:   No current psychiatric medications prescribed     Medication Compliance:   NA     Changes in Health Issues:   Yes: stomach, Associated Psychological Distress     Chemical Use Review:   Substance Use: Chemical use reviewed, no active concerns identified      Tobacco Use: No current tobacco use.      Diagnosis:  No diagnosis found.    Collateral Reports Completed:   Not Applicable    PLAN: (Patient Tasks / Therapist Tasks / Other)  1.  Continue  A-Z sleep strategy: tire your brain with an activity - A-Z think of something that starts with each letter in a different category (I.e. countries, foods, songs, movies)  2.  Continue self-care.  3.  Increased awareness of thoughts/feelings/behaviors to catch stress early and reduce by being in the present moment.      Ramona Valentine, [facility] May 17, 2021  Reviewed and signed by Man Umana [facility] May 18, 2021                                                      ______________________________________________________________________    Treatment Plan    Patient's Name: Artie Davis  YOB: 1980    Date: 5/10/21    DSM5 Diagnoses: 300.02 (F41.1) Generalized Anxiety Disorder  Psychosocial / Contextual Factors: Historical anxiety symptoms which are increased due to COVID-19, family stress, work stress, relocation  WHODAS:     Referral / Collaboration:  Referral to another professional/service is not indicated at this time..    Anticipated number of session or this episode of care: 12    MeasurableTreatment Goal(s) related to diagnosis / functional impairment(s)  Goal 1: Patient will reduce intensity and frequency of symptoms of anxiety and increased use of tools and manage anxiety.    I will know I've met my goal when I have time to myself and I don't think about the next thing I have to do, my mind isn't racing.      Objective #A (Patient Action)    Patient will use at least 2 coping skills for anxiety management in the next 12 weeks.  Status: New - Date: 5/10/21     Intervention(s)  Therapist will assign homework to practice coping strategies and discuss efficacy of tools in session  provide educational materials on CBT pattern of behavior, deep breathing, exercise, thought restructuring, self-care.      Goal 2: Patient will improve interpersonal effectiveness in relationships.    I will know I've met my goal when we enjoy our time together, doing more things we want to do.      Objective #A (Patient Action)    Status: New - Date: 5/10/21     Patient will learn & utilize at least 1 assertive communication skills weekly.    Intervention(s)  Therapist will assign homework to practice assertive communication and discuss efficacy in session  teach assertiveness skills. [place], FAST, GIVE, assertive versus passive or aggressive communication, unmet expectations.    Objective #B  Patient will practice setting boundaries 1 times in the next 12 weeks.    Status: New  - Date: 5/10/21     Intervention(s)  Therapist will assign homework practice setting boundaries in relationships and discuss efficacy in session  teach about healthy boundaries. and strategies to support healthy personal boundaries personally and professionally.      Patient has reviewed and agreed to the above plan.      Ramona Valentine, [facility]  May 10, 2021  Reviewed and signed by Man Umana [facility] May 12, 2021

## 2021-05-17 NOTE — PATIENT INSTRUCTIONS
PLAN: (Patient Tasks / Therapist Tasks / Other)  1.  Continue  A-Z sleep strategy: tire your brain with an activity - A-Z think of something that starts with each letter in a different category (I.e. countries, foods, songs, movies)  2.  Continue self-care.  3.  Increased awareness of thoughts/feelings/behaviors to catch stress early and reduce by being in the present moment.

## 2021-05-24 ENCOUNTER — VIRTUAL VISIT (OUTPATIENT)
Dept: PSYCHOLOGY | Facility: CLINIC | Age: 41
End: 2021-05-24
Payer: COMMERCIAL

## 2021-05-24 DIAGNOSIS — F32.0 MAJOR DEPRESSIVE DISORDER, SINGLE EPISODE, MILD (H): ICD-10-CM

## 2021-05-24 DIAGNOSIS — F41.1 GAD (GENERALIZED ANXIETY DISORDER): Primary | ICD-10-CM

## 2021-05-24 PROCEDURE — 90834 PSYTX W PT 45 MINUTES: CPT | Mod: 95

## 2021-05-24 NOTE — PROGRESS NOTES
"[facility]   Provider Name:  Ramona Valentine     Credentials:  MSW, LGSW    PATIENT'S NAME: Artie Davis  PREFERRED NAME: Artie  PRONOUNS: he/him/his     MRN: [id number]  : 1980  ADDRESS: [address] [facility] [address]  ACCT. NUMBER:  [id number]  DATE OF SERVICE: 21  START TIME: 11:00am  END TIME: 12:00pm  PREFERRED PHONE: [phone]  May we leave a program related message: Yes  SERVICE MODALITY:  Video Visit:      Provider verified identity through the following two step process.  Patient provided:  Patient is known previously to provider    Telemedicine Visit: The patient's condition can be safely assessed and treated via synchronous audio and visual telemedicine encounter.      Reason for Telemedicine Visit: Services only offered telehealth    Originating Site (Patient Location): Patient's home    Distant Site (Provider Location): Provider Remote Setting    Consent:  The patient/guardian has verbally consented to: the potential risks and benefits of telemedicine (video visit) versus in person care; bill my insurance or make self-payment for services provided; and responsibility for payment of non-covered services.     Patient would like the video invitation sent by:  Send to e-mail at: [email or web address]    Mode of Communication:  Video Conference via [facility]    As the provider I attest to compliance with applicable laws and regulations related to telemedicine.    UNIVERSAL ADULT Mental Health DIAGNOSTIC ASSESSMENT    Identifying Information:  Patient is a 40 year old,   male.  The pronoun use throughout this assessment reflects the patient's chosen pronoun.  Patient was referred for an assessment by self and primary care provider .  Patient attended the session alone.     Chief Complaint:   The reason for seeking services at this time is: \" anxiety, mind racing, hard to relax, 4-5 years of event after event which is a lot to process, can't get healthy " "(numerous health complaints from GI, hernia, back pain, ulcer, clogged oil gland in eye x2) \"   The problem(s) began in college. Patient has not attempted to resolve these concerns in the past.    Social/Family History:  Patient reported they grew up in [place], [place].  They were raised by biological parents.  Parents  23 years ago when the client was 17 years old. The client's mother did not remarry and remains single The client's father did remarry.  Artie is the oldest of 3 children, his younger brother (middle child)  suddenly of bacterial meningitis in 1998.  This was very difficult for the family.   Patient reported that their childhood was good, lots of kids/boys on their block, field behind their house where they played baseball, football, their house was \"the hang out house\", good relationships with his brothers.  Patient described their current relationships with family of origin as good relationship with his mother, talk regularly, she can be overly involved in his life, dad lives in [place] and doesn't see often, good relationship with youngest brother whose life is less stable than Artie's.      The patient describes their cultural background as Causasian, Evangelical, private J[employer] based school educated, suburban.  Cultural influences and impact on patient's life structure, values, norms, and healthcare: Racial or Ethnic Self-Identification , dominant culture, Social Orientation: middle class and Spiritual Beliefs: Adventist.  Contextual influences on patient's health include: Individual Factors , father of one child, supporting spouse to find less stressful job, history of anxiety which has increased due to daugher's health conditions, COVID-19, stress related medical conditions, Family Factors working from home, periods of time within the past year with no childcare due to global pandemic which increased stress level, Societal Factors global pandemic, Economic Factors Artie " and spouse employed full-time in high stress jobs, spouse acceped a new job in [place] which will require relocation and Health- Seeking Factors multiple health conditions within the past couple of years influenced by stress, seeks western medicine to treat.    These factors will be addressed in the Preliminary Treatment plan.  Patient identified their preferred language to be English. Patient reported they does not need the assistance of an  or other support involved in therapy.     Patient reported had no significant delays in developmental tasks.   Patient's highest education level was college graduate. Patient identified the following learning problems: none reported.  Modifications will not be used to assist communication in therapy.   Patient reports they are  able to understand written materials.    Patient reported the following relationship history two significant relationships, including his current marriage.  Patient's current relationship status is  for 4.5 years.   Patient identified their sexual orientation as heterosexual.  Patient reported having one child(micha). Patient identified partner and friends as part of their support system.  Patient identified the quality of these relationships as stable and meaningful.      Patient's current living/housing situation involves staying in own home/apartment.  They live with their spouse, daughter (age 3) and their dog and they report that housing is stable.     Patient is currently employed full time and reports they are able to function appropriately at work..  Patient reports their finances are obtained through employment.  Patient does not identify finances as a current stressor.      Patient reported that they have not been involved with the legal system.   Patient denies being on probation / parole / under the jurisdiction of the court.    Patient's Strengths and Limitations:  Patient identified the following strengths or resources  that will help them succeed in treatment: commitment to health and well being, friends / good social support, family support, insight, intelligence, positive work environment, motivation, strong social skills and work ethic. Things that may interfere with the patient's success in treatment include: none identified.     Personal and Family Medical History:   Patient does not report a family history of mental health concerns.  Patient reports family history includes Cerebrovascular Disease in his maternal grandfather; Clotting Disorder in his father; Heart Surgery in his maternal grandfather; Hypertension in his maternal aunt. Drug and alcohol abuse in brother. Possible alcohol abuse in father.    Patient does not report Mental Health Diagnosis or Treatment.      Patient has had a physical exam to rule out medical causes for current symptoms.  Date of last physical exam was within the past year. Client was encouraged to follow up with PCP if symptoms were to develop. The patient has a [place] Primary Care Provider, who is named Roni Cramer.  Patient reports the following current medical concerns: ulcer, see problem list for further information.  Patient reports pain concerns including plantar fasciitis.  Patient does not want help addressing pain concerns..   There are not significant appetite / nutritional concerns / weight changes.   Patient does not report a history of head injury / trauma / cognitive impairment.      Patient reports current meds as:   No outpatient medications have been marked as taking for the 5/3/21 encounter (Virtual Visit) with Ramona Valentine LGSW.       Medication Adherence:  Patient reports taking prescribed medications as prescribed.    Patient Allergies:  No Known Allergies    Medical History:    Past Medical History:   Diagnosis Date     Seasonal allergic rhinitis     Spring         Current Mental Status Exam:   Appearance:  Appropriate    Eye Contact:  Good    Psychomotor:  Normal  Restless       Gait / station:  no problem  Attitude / Demeanor: Cooperative  Interested Pleasant Attentive  Speech      Rate / Production: Normal/ Responsive Talkative      Volume:  Normal  volume      Language:  no problems  Mood:   Normal  Affect:   Appropriate    Thought Content: Clear  Rumination   Thought Process: Coherent  Goal Directed       Associations: No loosening of associations  Insight:   Fair   Judgment:  Intact   Orientation:  All  Attention/concentration: Good    Rating Scales:    PHQ9:    PHQ-9 SCORE 4/12/2021 5/10/2021   PHQ-9 Total Score MyChart 10 (Moderate depression) 10 (Moderate depression)   PHQ-9 Total Score 10 10   ;    GAD7:    MC-7 SCORE 4/12/2021 5/10/2021   Total Score 14 (moderate anxiety) 17 (severe anxiety)   Total Score 14 17     CGI:     First:Considering your total clinical experience with this particular patient population, how severe are the patient's symptoms at this time?: 3 (5/23/2021  9:14 PM)  ;    Most recentNo data recorded    Substance Use:  Patient did report a family history of substance use concerns; see medical history section for details.  Patient has not received chemical dependency treatment in the past.  Patient has not ever been to detox.      Patient is not currently receiving any chemical dependency treatment. Patient reported the following problems as a result of their substance use:  none identified.    Patient reports using alcohol 2 times per week and has 1 beers at a time. Patient first started drinking at age 20.  Patient reported date of last use was did not assess.  Patient reports heaviest use was in college.  Patient denies using tobacco.  Smoked cigars, occasionally, in college.  Patient denies using cannabis.  Tried 10-15 years ago, once.  Patient reports using caffeine 2 times per day and drinks 1 at a time. Patient started using caffeine at age did not assess.  Patient reports using/abusing the following substance(s).  "Patient reported no other substance use.     CAGE- AID:    CAGE-AID Total Score 5/23/2021   Total Score 0       Substance Use: No symptoms    Based on the negative CAGE score and clinical interview there  are not indications of drug or alcohol abuse.      Significant Losses / Trauma / Abuse / Neglect Issues:   Patient   did not serve in the .  There are indications or report of significant loss, trauma, abuse or neglect issues related to: death of his younger brother, divorce / relational changes ex-girlfriend moved away and ended their relationship and hearing his parents fighting as a child, his \"mom hates dad\", friend getting beat up.  Concerns for possible neglect are not present.     Safety Assessment:   Current Safety Concerns:  [place] Suicide Severity Rating Scale (Lifetime/Recent)  [place] Suicide Severity Rating (Lifetime/Recent) 4/12/2021   1. Wish to be Dead (Lifetime) No   1. Wish to be Dead (Recent) No   2. Non-Specific Active Suicidal Thoughts (Lifetime) No   2. Non-Specific Active Suicidal Thoughts (Recent) No   3. Active Suicidal Ideation with any Methods (Not Plan) Without Intent to Act (Lifetime) No   3. Active Suicidal Ideation with any Methods (Not Plan) Without Intent to Act (Recent) No   4. Active Suicidal Ideation with Some Intent to Act, Without Specific Plan (Lifetime) No   4. Active Suicidal Ideation with Some Intent to Act, Without Specific Plan (Recent) No   5. Active Suicidal Ideation with Specific Plan and Intent (Lifetime) No   5. Active Suicidal Ideation with Specific Plan and Intent (Recent) No   Most Severe Ideation Rating (Lifetime) NA   Frequency (Lifetime) NA   Duration (Lifetime) NA   Controllability (Lifetime) NA   Protective Factors  (Lifetime) NA   Reasons for Ideation (Lifetime) NA   Most Severe Ideation Rating (Past Month) NA   Frequency (Past Month) NA   Duration (Past Month) NA   Controllability (Past Month) NA   Protective Factors (Past Month) NA   Reasons for " Ideation (Past Month) NA   Actual Attempt (Lifetime) No   Actual Attempt (Past 3 Months) No   Has subject engaged in non-suicidal self-injurious behavior? (Lifetime) No   Has subject engaged in non-suicidal self-injurious behavior? (Past 3 Months) No   Interrupted Attempts (Lifetime) No   Interrupted Attempts (Past 3 Months) No   Aborted or Self-Interrupted Attempt (Lifetime) No   Aborted or Self-Interrupted Attempt (Past 3 Months) No   Preparatory Acts or Behavior (Lifetime) No   Preparatory Acts or Behavior (Past 3 Months) No   Most Recent Attempt Actual Lethality Code NA   Most Lethal Attempt Actual Lethality Code NA   Initial/First Attempt Actual Lethality Code NA     Patient denies current homicidal ideation and behaviors.  Patient denies current self-injurious ideation and behaviors.    Patient denied risk behaviors associated with substance use.  Patient denies any high risk behaviors associated with mental health symptoms.  Patient reports the following current concerns for their personal safety: None.  Patient reports there  no firearms in the house.        History of Safety Concerns:  Patient denied a history of homicidal ideation.     Patient denied a history of personal safety concerns.    Patient denied a history of assaultive behaviors.    Patient denied a history of sexual assault behaviors.     Patient denied a history of risk behaviors associated with substance use.  Patient denies any history of high risk behaviors associated with mental health symptoms.  Patient reports the following protective factors:      Risk Plan:  See Recommendations for Safety and Risk Management Plan    Review of Symptoms per patient report:  Depression: Change in sleep, Lack of interest, Excessive or inappropriate guilt, Change in energy level, Difficulties concentrating, Change in appetite, Ruminations, Irritability and Feeling sad, down, or depressed  Abigail:  No Symptoms  Psychosis: No Symptoms  Anxiety: Excessive  worry, Nervousness, Physical complaints, such as headaches, stomachaches, muscle tension, Sleep disturbance, Psychomotor agitation, Ruminations, Poor concentration and Irritability  Panic:  No symptoms  Post Traumatic Stress Disorder:  No Symptoms   Eating Disorder: No Symptoms  ADD / ADHD:  No symptoms  Conduct Disorder: No symptoms  Autism Spectrum Disorder: No symptoms  Obsessive Compulsive Disorder: No Symptoms    Patient reports the following compulsive behaviors and treatment history: none identified by client.      Diagnostic Criteria:   A. Excessive anxiety and worry about a number of events or activities (such as work or school performance).   B. The person finds it difficult to control the worry.   - Restlessness or feeling keyed up or on edge.    - Being easily fatigued.    - Difficulty concentrating or mind going blank.    - Irritability.    - Muscle tension.    - Sleep disturbance (difficulty falling or staying asleep, or restless unsatisfying sleep).   D. The focus of the anxiety and worry is not confined to features of an Axis I disorder.  E. The anxiety, worry, or physical symptoms cause clinically significant distress or impairment in social, occupational, or other important areas of functioning.   F. The disturbance is not due to the direct physiological effects of a substance (e.g., a drug of abuse, a medication) or a general medical condition (e.g., hyperthyroidism) and does not occur exclusively during a Mood Disorder, a Psychotic Disorder, or a Pervasive Developmental Disorder.   - Depressed mood. Note: In children and adolescents, can be irritable mood.     - Diminished interest or pleasure in all, or almost all, activities.    - Decreased sleep.    - Psychomotor activity agitation.    - Fatigue or loss of energy.    - Feelings of worthlessness or inappropriate and excessive guilt.    - Diminished ability to think or concentrate, or indecisiveness.   B) The symptoms cause clinically  "significant distress or impairment in social, occupational, or other important areas of functioning  C) The episode is not attributable to the physiological effects of a substance or to another medical condition  D) The occurence of major depressive episode is not better explained by other thought / psychotic disorders  E) There has never been a manic episode or hypomanic episode    Functional Status:  Patient reports the following functional impairments: home life with spouse and relationship(s).     WHODAS: No flowsheet data found.  Nonprogrammatic care:  Patient is requesting basic services to address current mental health concerns.    Clinical Summary:  1. Reason for assessment: increase in anxiety due to global pandemic, \"event after event\" which increased his stress level over the past 4-5 years.  2. Psychosocial, Cultural and Contextual Factors: Artie is a , , male.  Artie's toddler daughter has a heart condition which increased concerns regarding mike COVID-19.  His spouse works in a stressful job in healthcare, interest in supporting her to have a lower stress job lead to new employment in [place].  Artie and his family are moving this summer which increases stress due to relocation process.  Artie's younger brother  of bacterial meningitis at age 14 (Artie was 17) which was sudden and very difficult for the family.  His parents had recently gotten .  Artie has a history of high achievement and \"W.I.T.\" whatever it takes to get things accomplished, he \"over extends himself\" which has led to medical concerns - back pain, plantar fasciitis, ulcer, eye problems, hernia.    3. Principal DSM5 Diagnoses  (Sustained by DSM5 Criteria Listed Above):   296.31 (F33.0) Major Depressive Disorder, Recurrent Episode, Mild _  300.02 (F41.1) Generalized Anxiety Disorder.  4. Other Diagnoses that is relevant to services:   None per client symptom report.  5. Provisional Diagnosis:  296.31 " (F33.0) Major Depressive Disorder, Recurrent Episode, Mild _  300.02 (F41.1) Generalized Anxiety Disorder as evidenced by excessive worry, difficulty managing or controlling worry, racing thoughts, rumination, difficulty relaxing.  6. Prognosis: Expect Improvement and Relieve Acute Symptoms.  7. Likely consequences of symptoms if not treated: continued mood disruption, interpersonal relationship challenges, possible reduction in workplace functioning.  8. Client strengths include:  caring, educated, empathetic, employed, goal-focused, good listener, insightful, intelligent, motivated, open to learning, open to suggestions / feedback, responsible parent, support of family, friends and providers, wants to learn, willing to ask questions and work history .     Recommendations:     1. Plan for Safety and Risk Management:   Recommended that patient call 911 or go to the local ED should there be a change in any of these risk factors..          Report to child / adult protection services was NA.     2. Patient's identified no mental health concerns with a cultural influence will be addressed by provider.     3. Initial Treatment will focus on:    Depressed Mood - low mood, feeling bad about himself, difficulty asking for help, taking on too much  Anxiety - ruminations, excessive worry, irritability, sleep disturbance.     4. Resources/Service Plan:    services are not indicated.   Modifications to assist communication are not indicated.   Additional disability accommodations are not indicated.      5. Collaboration:   Collaboration / coordination of treatment will be initiated with the following  support professionals: primary care physician.      6.  Referrals:   The following referral(s) will be initiated: none indicated by assessment. Next Scheduled Appointment: n/a.     A Release of Information has been obtained for the following: not applicable.    7. PRITI:    PRITI:  Discussed the general effects of drugs and  alcohol on health and well-being. Provider gave patient printed information about the effects of chemical use on their health and well being. Recommendations:  No concerns about use per information obtained in assessment.     8. Records:   These were reviewed at time of assessment.   Information in this assessment was obtained from the medical record and  provided by patient who is a good historian.    Patient will have open access to their mental health medical record.        Provider Name/ Credentials:  RACH Dillon  May 23, 2021    Reviewed and signed by CARLOS Bedoya May 25, 2021

## 2021-05-24 NOTE — PATIENT INSTRUCTIONS
"PLAN: (Patient Tasks / Therapist Tasks / Other)  1.  Continue self-care daily.  3.  Interpersonal effectiveness tool \"I feel / when\" to increase productive communication.      "

## 2021-05-24 NOTE — PROGRESS NOTES
Progress Note    Patient Name: Artie Davis  Date: 5/24/21         Service Type: Individual      Session Start Time: 11:00am  Session End Time: 11:45am     Session Length: 45 minutes    Session #: 5    Attendees: Client    Service Modality:  Video Visit:      Provider verified identity through the following two step process.  Patient provided:  Patient is known previously to provider    Telemedicine Visit: The patient's condition can be safely assessed and treated via synchronous audio and visual telemedicine encounter.      Reason for Telemedicine Visit: Services only offered telehealth    Originating Site (Patient Location): Patient's home    Distant Site (Provider Location): Provider Remote Setting    Consent:  The patient/guardian has verbally consented to: the potential risks and benefits of telemedicine (video visit) versus in person care; bill my insurance or make self-payment for services provided; and responsibility for payment of non-covered services.     Patient would like the video invitation sent by:  Send to e-mail at: [email or web address]    Mode of Communication:  Video Conference via Amwell    As the provider I attest to compliance with applicable laws and regulations related to telemedicine.     Treatment Plan Last Reviewed: 5/10/21  PHQ-9 / MC-7 :  5/10/2021       DATA  Interactive Complexity: No  Crisis: No       Progress Since Last Session (Related to Symptoms / Goals / Homework):   Symptoms: No change Artie reports no change in his symptoms of anxiety or depression since our last session.    Homework: Achieved / completed to satisfaction. Used sleep strategies, increased self-care      Episode of Care Goals: Satisfactory progress - CONTEMPLATION (Considering change and yet undecided); Intervened by assessing the negative and positive thinking (ambivalence) about behavior change     Current / Ongoing Stressors and Concerns:   Artie has a history of  anxiety since college.  Death of younger brother in teen years impacted Artie and family dynamics.  He has had high expectations for himself since adolescence.  His stress level has increased in the past 5 years with marriage, child raising, work stress for him and his wife.  Artie pushes himself to manage more and more to support his family which is taking a significant toll on him physically and emotionally.    Artie reports continued stress related to tasks related to relocation, work, health concerns for family due to COVID-19 and concerns for the safety of their unvaccinated daughter.       Treatment Objective(s) Addressed in This Session:   Feel less tired and more energy during the day   Identify negative self-talk and behaviors: challenge core beliefs, myths, and actions  learn & utilize at least 1 assertive communication skills weekly  identify 1 sleep hygiene practices  A-Z sleep strategy / increased mindfulness and awareness / interpersonal effectiveness     Intervention:   Emotion Focused Therapy: Validated the stress Artie is managing regarding support to his spouse and their upcoming relocation.  Artie wants to use interpersonal effectiveness tools to engage in productive conversations regarding COVID safety protocols, concerns regarding upcoming move.  Discussed strategies for timing and tools to use to cultivate a productive environment.          ASSESSMENT: Current Emotional / Mental Status (status of significant symptoms):   Risk status (Self / Other harm or suicidal ideation)   Patient denies current fears or concerns for personal safety.   Patient denies current or recent suicidal ideation or behaviors.   Patient denies current or recent homicidal ideation or behaviors.   Patient denies current or recent self injurious behavior or ideation.   Patient denies other safety concerns.   Patient reports there has been no change in risk factors since their last session.     Patient reports there has been no  "change in protective factors since their last session.     Recommended that patient call 911 or go to the local ED should there be a change in any of these risk factors.     Appearance:   Appropriate    Eye Contact:   Good    Psychomotor Behavior: Normal    Attitude:   Cooperative  Interested Friendly   Orientation:   All   Speech    Rate / Production: Talkative Normal     Volume:  Normal    Mood:    Anxious  Normal   Affect:    Appropriate    Thought Content:  Clear  Rumination    Thought Form:  Coherent  Goal Directed  Logical    Insight:    Fair      Medication Review:   No current psychiatric medications prescribed     Medication Compliance:   NA     Changes in Health Issues:   Yes: stomach, Associated Psychological Distress     Chemical Use Review:   Substance Use: Chemical use reviewed, no active concerns identified      Tobacco Use: No current tobacco use.      Diagnosis:  1. MC (generalized anxiety disorder)    2. Major depressive disorder, single episode, mild (H)        Collateral Reports Completed:   Not Applicable    PLAN: (Patient Tasks / Therapist Tasks / Other)  1.  Continue self-care daily.  3.  Interpersonal effectiveness tool \"I feel / when\" to increase productive communication.      Ramona Valentine, [facility] May 24, 2021        Reviewed and signed by Man Umana, [facility] May 25, 2021                                             ______________________________________________________________________    Treatment Plan    Patient's Name: Artie Davis  YOB: 1980    Date: 5/10/21    DSM5 Diagnoses: 300.02 (F41.1) Generalized Anxiety Disorder  Psychosocial / Contextual Factors: Historical anxiety symptoms which are increased due to COVID-19, family stress, work stress, relocation  WHODAS:     Referral / Collaboration:  Referral to another professional/service is not indicated at this time..    Anticipated number of session or this episode of care: 12    MeasurableTreatment Goal(s) " related to diagnosis / functional impairment(s)  Goal 1: Patient will reduce intensity and frequency of symptoms of anxiety and increased use of tools and manage anxiety.    I will know I've met my goal when I have time to myself and I don't think about the next thing I have to do, my mind isn't racing.      Objective #A (Patient Action)    Patient will use at least 2 coping skills for anxiety management in the next 12 weeks.  Status: New - Date: 5/10/21     Intervention(s)  Therapist will assign homework to practice coping strategies and discuss efficacy of tools in session  provide educational materials on CBT pattern of behavior, deep breathing, exercise, thought restructuring, self-care.      Goal 2: Patient will improve interpersonal effectiveness in relationships.    I will know I've met my goal when we enjoy our time together, doing more things we want to do.      Objective #A (Patient Action)    Status: New - Date: 5/10/21     Patient will learn & utilize at least 1 assertive communication skills weekly.    Intervention(s)  Therapist will assign homework to practice assertive communication and discuss efficacy in session  teach assertiveness skills. AZUCENA, FAST, GIVE, assertive versus passive or aggressive communication, unmet expectations.    Objective #B  Patient will practice setting boundaries 1 times in the next 12 weeks.    Status: New - Date: 5/10/21     Intervention(s)  Therapist will assign homework practice setting boundaries in relationships and discuss efficacy in session  teach about healthy boundaries. and strategies to support healthy personal boundaries personally and professionally.      Patient has reviewed and agreed to the above plan.      Ramona Valentine, PETE  May 10, 2021  Reviewed and signed by Man Umana [facility] May 12, 2021

## 2021-05-26 ENCOUNTER — APPOINTMENT (OUTPATIENT)
Dept: URBAN - METROPOLITAN AREA CLINIC 260 | Age: 41
Setting detail: DERMATOLOGY
End: 2021-05-27

## 2021-05-26 VITALS — WEIGHT: 180 LBS | RESPIRATION RATE: 15 BRPM | HEIGHT: 72 IN

## 2021-05-26 DIAGNOSIS — L81.4 OTHER MELANIN HYPERPIGMENTATION: ICD-10-CM

## 2021-05-26 DIAGNOSIS — D18.0 HEMANGIOMA: ICD-10-CM

## 2021-05-26 DIAGNOSIS — Z71.89 OTHER SPECIFIED COUNSELING: ICD-10-CM

## 2021-05-26 DIAGNOSIS — L82.1 OTHER SEBORRHEIC KERATOSIS: ICD-10-CM

## 2021-05-26 DIAGNOSIS — L20.89 OTHER ATOPIC DERMATITIS: ICD-10-CM

## 2021-05-26 DIAGNOSIS — D22 MELANOCYTIC NEVI: ICD-10-CM

## 2021-05-26 PROBLEM — D18.01 HEMANGIOMA OF SKIN AND SUBCUTANEOUS TISSUE: Status: ACTIVE | Noted: 2021-05-26

## 2021-05-26 PROBLEM — D22.5 MELANOCYTIC NEVI OF TRUNK: Status: ACTIVE | Noted: 2021-05-26

## 2021-05-26 PROCEDURE — 99204 OFFICE O/P NEW MOD 45 MIN: CPT

## 2021-05-26 PROCEDURE — OTHER COUNSELING: OTHER

## 2021-05-26 PROCEDURE — OTHER PRESCRIPTION MEDICATION MANAGEMENT: OTHER

## 2021-05-26 ASSESSMENT — LOCATION DETAILED DESCRIPTION DERM
LOCATION DETAILED: LEFT RADIAL DORSAL HAND
LOCATION DETAILED: LEFT ULNAR DORSAL HAND
LOCATION DETAILED: LEFT BUTTOCK
LOCATION DETAILED: LEFT LATERAL BUTTOCK
LOCATION DETAILED: INFERIOR THORACIC SPINE

## 2021-05-26 ASSESSMENT — LOCATION ZONE DERM
LOCATION ZONE: TRUNK
LOCATION ZONE: HAND
LOCATION ZONE: HAND
LOCATION ZONE: TRUNK

## 2021-05-26 ASSESSMENT — LOCATION SIMPLE DESCRIPTION DERM
LOCATION SIMPLE: LEFT HAND
LOCATION SIMPLE: LEFT BUTTOCK
LOCATION SIMPLE: LEFT HAND
LOCATION SIMPLE: UPPER BACK
LOCATION SIMPLE: LEFT BUTTOCK

## 2021-05-26 ASSESSMENT — SEVERITY ASSESSMENT 2020: SEVERITY 2020: MILD

## 2021-05-26 NOTE — PROCEDURE: PRESCRIPTION MEDICATION MANAGEMENT
Render In Strict Bullet Format?: No
Detail Level: Simple
Continue Regimen: use clobetasol twice daily on affected skin as needed - previously prescribed.\\nuse less than 14 days per month\\nmoisturizer regularly.

## 2021-06-04 VITALS
HEART RATE: 49 BPM | OXYGEN SATURATION: 97 % | BODY MASS INDEX: 25.63 KG/M2 | DIASTOLIC BLOOD PRESSURE: 79 MMHG | RESPIRATION RATE: 16 BRPM | WEIGHT: 189 LBS | TEMPERATURE: 98.3 F | SYSTOLIC BLOOD PRESSURE: 133 MMHG

## 2021-06-04 VITALS — HEIGHT: 72 IN | BODY MASS INDEX: 25.33 KG/M2 | WEIGHT: 187 LBS

## 2021-06-04 VITALS
WEIGHT: 188.8 LBS | SYSTOLIC BLOOD PRESSURE: 114 MMHG | DIASTOLIC BLOOD PRESSURE: 84 MMHG | OXYGEN SATURATION: 99 % | HEART RATE: 44 BPM | TEMPERATURE: 97.8 F | BODY MASS INDEX: 25.61 KG/M2

## 2021-06-05 VITALS
WEIGHT: 181.8 LBS | BODY MASS INDEX: 24.62 KG/M2 | DIASTOLIC BLOOD PRESSURE: 71 MMHG | SYSTOLIC BLOOD PRESSURE: 121 MMHG | HEIGHT: 72 IN | HEART RATE: 64 BPM

## 2021-06-05 VITALS
WEIGHT: 189 LBS | BODY MASS INDEX: 25.81 KG/M2 | OXYGEN SATURATION: 100 % | HEART RATE: 52 BPM | DIASTOLIC BLOOD PRESSURE: 78 MMHG | SYSTOLIC BLOOD PRESSURE: 126 MMHG

## 2021-06-05 VITALS
DIASTOLIC BLOOD PRESSURE: 80 MMHG | SYSTOLIC BLOOD PRESSURE: 124 MMHG | TEMPERATURE: 97.9 F | OXYGEN SATURATION: 99 % | BODY MASS INDEX: 24.95 KG/M2 | RESPIRATION RATE: 17 BRPM | WEIGHT: 184 LBS | HEART RATE: 57 BPM

## 2021-06-07 ENCOUNTER — VIRTUAL VISIT (OUTPATIENT)
Dept: PSYCHOLOGY | Facility: CLINIC | Age: 41
End: 2021-06-07
Payer: COMMERCIAL

## 2021-06-07 DIAGNOSIS — F32.0 MAJOR DEPRESSIVE DISORDER, SINGLE EPISODE, MILD (H): ICD-10-CM

## 2021-06-07 DIAGNOSIS — F41.1 GAD (GENERALIZED ANXIETY DISORDER): Primary | ICD-10-CM

## 2021-06-07 PROCEDURE — 90834 PSYTX W PT 45 MINUTES: CPT | Mod: 95

## 2021-06-07 NOTE — PROGRESS NOTES
Progress Note    Patient Name: Artie Davis  Date: 6/7/21         Service Type: Individual      Session Start Time: 11:00am  Session End Time: 11:45am     Session Length: 45 minutes    Session #: 6    Attendees: Client    Service Modality:  Video Visit:      Provider verified identity through the following two step process.  Patient provided:  Patient is known previously to provider    Telemedicine Visit: The patient's condition can be safely assessed and treated via synchronous audio and visual telemedicine encounter.      Reason for Telemedicine Visit: Services only offered telehealth    Originating Site (Patient Location): Patient's home    Distant Site (Provider Location): Provider Remote Setting    Consent:  The patient/guardian has verbally consented to: the potential risks and benefits of telemedicine (video visit) versus in person care; bill my insurance or make self-payment for services provided; and responsibility for payment of non-covered services.     Patient would like the video invitation sent by:  Send to e-mail at: [email or web address]    Mode of Communication:  Video Conference via Amwell    As the provider I attest to compliance with applicable laws and regulations related to telemedicine.     Treatment Plan Last Reviewed: 5/10/21  PHQ-9 / MC-7 :  5/10/2021       DATA  Interactive Complexity: No  Crisis: No       Progress Since Last Session (Related to Symptoms / Goals / Homework):   Symptoms: No change Artie reports no change in his symptoms of anxiety or depression since our last session.    Homework: Achieved / completed to satisfaction.  Read book about Tension Myoneural Syndrome, increased activation of walking/running      Episode of Care Goals: Satisfactory progress - CONTEMPLATION (Considering change and yet undecided); Intervened by assessing the negative and positive thinking (ambivalence) about behavior change     Current / Ongoing Stressors  and Concerns:   Artie has a history of anxiety since college.  Death of younger brother in teen years impacted Artie and family dynamics.  He has had high expectations for himself since adolescence.  His stress level has increased in the past 5 years with marriage, child raising, work stress for him and his wife.  Artie pushes himself to manage more and more to support his family which is taking a significant toll on him physically and emotionally.    Artie continues to experience pain connected to his increased stress.  He has an understanding that the pain is a manifestation of his stress and not connected to an injury.  Progress with action in new knowledge regarding Tension Myoneural Syndrome and facing the anxiety with movement instead of avoidance.         Treatment Objective(s) Addressed in This Session:   Feel less tired and more energy during the day   Identify negative self-talk and behaviors: challenge core beliefs, myths, and actions  learn & utilize at least 1 assertive communication skills weekly  identify 1 sleep hygiene practices       Intervention:   Motivational Interviewing    MI Intervention: Expressed Empathy/Understanding, Supported Autonomy, Collaboration, Evocation, Open-ended questions and Reflections: simple and complex     Change Talk Expressed by the Patient: Desire to change Ability to change Reasons to change Committment to change Activation Taking steps    Provider Response to Change Talk: E - Evoked more info from patient about behavior change, A - Affirmed patient's thoughts, decisions, or attempts at behavior change and R - Reflected patient's change talk       Validated Artie's willingness to take in new information regarding his pain and the connection to stress versus injury.  He embraced the concepts in the book he read and put things in to practice by walking and running to move through his fear versus avoiding it.   Artie and increased insight regarding his level of competitiveness  and drive which he connects to anxiety beginning in middle school.  Hard work, preparation to help mitigate the anxiety of embarrassment or disappointment.        ASSESSMENT: Current Emotional / Mental Status (status of significant symptoms):   Risk status (Self / Other harm or suicidal ideation)   Patient denies current fears or concerns for personal safety.   Patient denies current or recent suicidal ideation or behaviors.   Patient denies current or recent homicidal ideation or behaviors.   Patient denies current or recent self injurious behavior or ideation.   Patient denies other safety concerns.   Patient reports there has been no change in risk factors since their last session.     Patient reports there has been no change in protective factors since their last session.     Recommended that patient call 911 or go to the local ED should there be a change in any of these risk factors.     Appearance:   Appropriate    Eye Contact:   Good    Psychomotor Behavior: Normal    Attitude:   Cooperative  Interested Friendly   Orientation:   All   Speech    Rate / Production: Talkative Normal     Volume:  Normal    Mood:    Anxious  Normal   Affect:    Appropriate    Thought Content:  Clear  Rumination    Thought Form:  Coherent  Goal Directed  Logical    Insight:    Fair      Medication Review:   No current psychiatric medications prescribed     Medication Compliance:   NA     Changes in Health Issues:   Yes: stomach, Associated Psychological Distress     Chemical Use Review:   Substance Use: Chemical use reviewed, no active concerns identified      Tobacco Use: No current tobacco use.      Diagnosis:  1. MC (generalized anxiety disorder)    2. Major depressive disorder, single episode, mild (H)        Collateral Reports Completed:   Not Applicable    PLAN: (Patient Tasks / Therapist Tasks / Other)  1.  Continue activation of TMS principles to challenge versus avoid the pain when exercising.      Ramona  Serge, [facility] July 18, 2021       Reviewed in supervision provided by Man Umana, [facility] July 22, 2021                                      ______________________________________________________________________    Treatment Plan    Patient's Name: Artie Davis  YOB: 1980    Date: 5/10/21    DSM5 Diagnoses: 300.02 (F41.1) Generalized Anxiety Disorder  Psychosocial / Contextual Factors: Historical anxiety symptoms which are increased due to COVID-19, family stress, work stress, relocation  WHODAS:     Referral / Collaboration:  Referral to another professional/service is not indicated at this time..    Anticipated number of session or this episode of care: 12    MeasurableTreatment Goal(s) related to diagnosis / functional impairment(s)  Goal 1: Patient will reduce intensity and frequency of symptoms of anxiety and increased use of tools and manage anxiety.    I will know I've met my goal when I have time to myself and I don't think about the next thing I have to do, my mind isn't racing.      Objective #A (Patient Action)    Patient will use at least 2 coping skills for anxiety management in the next 12 weeks.  Status: New - Date: 5/10/21     Intervention(s)  Therapist will assign homework to practice coping strategies and discuss efficacy of tools in session  provide educational materials on CBT pattern of behavior, deep breathing, exercise, thought restructuring, self-care.      Goal 2: Patient will improve interpersonal effectiveness in relationships.    I will know I've met my goal when we enjoy our time together, doing more things we want to do.      Objective #A (Patient Action)    Status: New - Date: 5/10/21     Patient will learn & utilize at least 1 assertive communication skills weekly.    Intervention(s)  Therapist will assign homework to practice assertive communication and discuss efficacy in session  teach assertiveness skills. AZUCENA, FAST, GIVE, assertive versus passive or  aggressive communication, unmet expectations.    Objective #B  Patient will practice setting boundaries 1 times in the next 12 weeks.    Status: New - Date: 5/10/21     Intervention(s)  Therapist will assign homework practice setting boundaries in relationships and discuss efficacy in session  teach about healthy boundaries. and strategies to support healthy personal boundaries personally and professionally.      Patient has reviewed and agreed to the above plan.      Ramona Valentine, [facility]  May 10, 2021  Reviewed and signed by Man Umana [facility] May 12, 2021

## 2021-06-09 ENCOUNTER — RECORDS - HEALTHEAST (OUTPATIENT)
Dept: ADMINISTRATIVE | Facility: OTHER | Age: 41
End: 2021-06-09

## 2021-06-11 NOTE — PATIENT INSTRUCTIONS - HE
1. US ordered will work on scheduling. This is to rule out hernia. No mass noted on exam today.   2 Recommend icing, Ibuprofen, and Tylenol in case this is musculoskeletal.   3. Follow up if no improvement.

## 2021-06-11 NOTE — PROGRESS NOTES
Chief Complaint   Patient presents with     Groin Pain     right side, heard pop x1 month ago       HPI:  Artie Davis is a 39 y.o. male who presents today complaining of ongoing right groin pain x 1 month.  Patient states that this pain started suddenly when he was lowering his 30 something pound 3-year-old down into a crib.  As he bent over he felt a pop sensation.  Patient reports that he is very active and does lift twice per week and does a lot of treadmill running.  He rested from lifting for the first week, but then continue with his normal activities.  Things seem to be improving, but over the past 1 week pain has been ramping up again.  Pain is worse when he bends over more so than when he is lifting something up.  He denies any masses or bumps, but does have a concern for possible hernia as he had a bilateral  lower abdominal hernia preparer when he was 6 years old.  He had gotten those hernias when lifting his great-grandmother.  Patient denies any fever, chills, constipation, or severe constant abdominal pain.  His groin pain is generally difficult to pinpoint and is just below the belt line on the right side.    History obtained from the patient.    Problem List:  There are no relevant problems documented for this patient.      No past medical history on file.    Social History     Tobacco Use     Smoking status: Former Smoker     Types: Cigars     Smokeless tobacco: Never Used     Tobacco comment: no vaping   Substance Use Topics     Alcohol use: Not on file       Review of Systems   Constitutional: Negative for chills and fever.   Gastrointestinal: Negative for abdominal pain, constipation, diarrhea and vomiting.        (-) abdominal mass   Genitourinary: Negative for scrotal swelling and testicular pain.   Musculoskeletal:        (+) right groin pain       Vitals:    08/30/20 1245   BP: 133/79   Patient Site: Left Arm   Patient Position: Sitting   Cuff Size: Adult Large   Pulse: (!) 49   Resp: 16    Temp: 98.3  F (36.8  C)   TempSrc: Oral   SpO2: 97%   Weight: 189 lb (85.7 kg)       Physical Exam  Vitals signs and nursing note reviewed.   Constitutional:       General: He is not in acute distress.     Appearance: He is well-developed. He is not diaphoretic.   HENT:      Head: Normocephalic and atraumatic.      Right Ear: External ear normal.      Left Ear: External ear normal.   Eyes:      General:         Right eye: No discharge.         Left eye: No discharge.      Conjunctiva/sclera: Conjunctivae normal.   Pulmonary:      Effort: Pulmonary effort is normal. No respiratory distress.   Abdominal:      General: Abdomen is flat. There is no distension.      Palpations: There is no mass.      Tenderness: There is no abdominal tenderness. There is no guarding.      Hernia: No hernia is present. There is no hernia in the left inguinal area or right inguinal area.   Genitourinary:     Scrotum/Testes: Normal.         Right: Mass, tenderness, testicular hydrocele or varicocele not present.         Left: Mass, tenderness, testicular hydrocele or varicocele not present.   Neurological:      Mental Status: He is alert.   Psychiatric:         Behavior: Behavior normal.         Thought Content: Thought content normal.         Judgment: Judgment normal.           Clinical Decision Making:  Unable to note hernia on PE, but I have ordered US to confirm the absence of hernia given his PMHx. Most likely alternative is muscular strain. Recommend starting on supportive cares.   At the end of the encounter, I discussed results, diagnosis, medications. Discussed red flags for immediate return to clinic/ER, as well as indications for follow up if no improvement. Patient understood and agreed to plan. Patient was stable for discharge.    1. Right groin pain  US Groin Non Vascular Right    CANCELED: US Groin Non Vascular Right    CANCELED: US Groin Non Vascular Right         Patient Instructions   1. US ordered will work on  scheduling. This is to rule out hernia. No mass noted on exam today.   2 Recommend icing, Ibuprofen, and Tylenol in case this is musculoskeletal.   3. Follow up if no improvement.

## 2021-06-11 NOTE — TELEPHONE ENCOUNTER
Left message for patient to call the clinic.  Please transfer to [phone] when he calls back.  Need to schedule ultrasound

## 2021-06-11 NOTE — TELEPHONE ENCOUNTER
Called and left message detailing results of ultrasound.  Stated that they did note a tiny inguinal hernia while the patient would Valsalva maneuver.  Recommended patient continue to monitor symptoms.  If discomfort and symptoms continue to persist or worsen, recommend the patient follow-up with his primary care provider.  Provided callback phone number if any further questions.

## 2021-06-11 NOTE — ANESTHESIA CARE TRANSFER NOTE
Last vitals:   Vitals:    09/10/20 1550   BP: 145/81   Pulse: 76   Resp: 14   Temp:    SpO2: 97%   /81   Pulse 76   Temp 37.1  C (98.7  F) (Temporal)   Resp 14   SpO2 97%     Patient's level of consciousness is drowsy  Spontaneous respirations: yes  Maintains airway independently: yes  Dentition unchanged: yes  Oropharynx: oropharynx clear of all foreign objects    QCDR Measures:  ASA# 20 - Surgical Safety Checklist: WHO surgical safety checklist completed prior to induction    PQRS# 430 - Adult PONV Prevention: 4558F - Pt received => 2 anti-emetic agents (different classes) preop & intraop  ASA# 8 - Peds PONV Prevention: NA - Not pediatric patient, not GA or 2 or more risk factors NOT present  PQRS# 424 - Laura-op Temp Management: 4559F - At least one body temp DOCUMENTED => 35.5C or 95.9F within required timeframe  PQRS# 426 - PACU Transfer Protocol: - Transfer of care checklist used  ASA# 14 - Acute Post-op Pain: ASA14A - Patient experienced pain >= 7 out of 10

## 2021-06-11 NOTE — TELEPHONE ENCOUNTER
Provider Communication  Who is calling:  Dr Soriano  Facility in which provider is associated: [facility]  Reason for call:    Results of Ultrasound Groin Non Vascular Right are in chart.  Urgency for return call:  See results in patient chart or call Dr Soriano for results.  As soon as possible.  Okay to leave detailed message?:  Yes

## 2021-06-11 NOTE — ANESTHESIA CARE TRANSFER NOTE
Last vitals:   Vitals:    09/10/20 1550   BP: 145/81   Pulse: 76   Resp: 14   Temp:    SpO2: 97%     Patient's level of consciousness is drowsy  Spontaneous respirations: yes  Maintains airway independently: yes  Dentition unchanged: yes  Oropharynx: oropharynx clear of all foreign objects    QCDR Measures:  ASA# 20 - Surgical Safety Checklist: WHO surgical safety checklist completed prior to induction    PQRS# 430 - Adult PONV Prevention: 4558F - Pt received => 2 anti-emetic agents (different classes) preop & intraop  ASA# 8 - Peds PONV Prevention: NA - Not pediatric patient, not GA or 2 or more risk factors NOT present  PQRS# 424 - Laura-op Temp Management: 4559F - At least one body temp DOCUMENTED => 35.5C or 95.9F within required timeframe  PQRS# 426 - PACU Transfer Protocol: - Transfer of care checklist used  ASA# 14 - Acute Post-op Pain: ASA14B - Patient did NOT experience pain >= 7 out of 10

## 2021-06-11 NOTE — PROGRESS NOTES
"  Artie Davis is a 40 y.o. male who is being evaluated via a billable telephone visit.      The patient has been notified of following:     \"This telephone visit will be conducted via a call between you and your physician/provider. We have found that certain health care needs can be provided without the need for a physical exam.  This service lets us provide the care you need with a short phone conversation.  If a prescription is necessary we can send it directly to your pharmacy.  If lab work is needed we can place an order for that and you can then stop by our lab to have the test done at a later time.    Telephone visits are billed at different rates depending on your insurance coverage. During this emergency period, for some insurers they may be billed the same as an in-person visit.  Please reach out to your insurance provider with any questions.    If during the course of the call the physician/provider feels a telephone visit is not appropriate, you will not be charged for this service.\"    HPI: Pt is s/p HERNIORRHAPHY, INGUINAL, ROBOT-ASSISTED, LAPAROSCOPIC, USING DA ANA XI, RIGHT  with Dr. Flynn on 9/10/20.   he is doing well.  Pain is well controlled:  Yes. No difficulties with the surgical wound/wounds, no erythema or drainage.  he is eating well and denies fever and chills.      Discussed return to activities and lifting restrictions.         Phone call duration: 21 minutes    Alexander Del Rosario PA-C            "

## 2021-06-11 NOTE — PROGRESS NOTES
"General Surgery Consultation    Consulting Provider: Lula Fried    CC: right inguinal hernia    History:   Artie Davis is a 40 y.o. male referred to see me for right inguinal hernia. Pt felt a \"tear\" when he was lifting his daughter about 5 weeks ago and also noticed a small bulge. He states since that time he has had intermittent right groin pain which is worse after exercise and certain activities such as yardwork and landscaping. He denies any fever, chills, abdominal pain, nausea, emesis, constipation. His surgical history is significant for bilateral inguinal hernia repairs at age 6.      Allergies:  Patient has no known allergies.    Past medical history:  No past medical history on file.    Past surgical history:  Past Surgical History:   Procedure Laterality Date     HERNIA REPAIR Bilateral     6 years old, mesh applied     WISDOM TOOTH EXTRACTION         Medications:  No current outpatient medications on file.    Family history:  Family History   Problem Relation Age of Onset     Heart disease Father        Social history:   reports that he has quit smoking. His smoking use included cigars. He has never used smokeless tobacco. He reports current alcohol use.    Review of Systems:  General: No complaints or constitutional symptoms  Skin: no rashes  Hematologic/Lymphatic: No symptoms or complaints  Psychiatric: No symptoms or complaints  Endocrine: No excessive fatigue, no hypermetabolic symptoms reported  Respiratory: No cough, shortness of breath, or wheezing  Cardiovascular: No chest pain or dyspnea on exertion  Gastrointestinal: see HPI  Musculoskeletal: No recent injuries reported  Neurological: No focal neurologic defects reported    Exam:  Ht 6' (1.829 m)   Wt 187 lb (84.8 kg)   BMI 25.36 kg/m    Body mass index is 25.36 kg/m .  General : Alert, cooperative, appears stated age   Skin: Skin color, texture, turgor normal, no rashes or lesions   Lymphatic: No obvious adenopathy, no swelling   Eyes: " No scleral icterus, pupils equal  HENT: No traumatic injury to the head or face, no gross abnormalities  Lungs: Normal respiratory effort, breath sounds equal bilaterally  Heart: Regular rate and rhythm  Abdomen: soft, nondistended, loose skin present, nontender to palpation throughout  Groin: small right inguinal hernia reducible, mildly tender to palpation, no overlying skin changes, no left inguinal hernia; bilateral well healed upper groin scars  Musculoskeletal: No obvious swelling  Neurologic: Grossly intact    Assessment/Plan:   Artie Davis is a 40 y.o. male with signs and symptoms consistent with recurrent right inguinal hernia.  I have explained the pathophysiology of inguinal hernias in detail as well as the surgical versus non-operative management strategies.      The risks of surgery were discussed in detail which include, but are not limited to, bleeding, infection, injury to surrounding structures, the need to convert to an open procedure.  Additionally, the risks of non operative management were discussed which include, but are not limited to, persistent pain, enlargement of hernia, bowel incarceration or strangulation.    He understands everything which was discussed and has consented to proceed with a robotic-assisted right inguinal hernia repair with mesh.  We will schedule surgery accordingly.     Rosaura Flynn MD  General Surgery  [facility]  [phone]

## 2021-06-12 NOTE — PROGRESS NOTES
Assessment:     1. Right inguinal hernia  US Groin Non Vascular Right   2. Rash  clobetasoL (TEMOVATE) 0.05 % cream          Plan:     Patient with recent right inguinal hernia repair on 9/10/2020, now with intense return of his pain for the past 2 days that is not improving.  Will order an ultrasound of his right groin today and will notify him of the results when we get it back.  He will plan to follow-up with general surgery in 2 days.  May take Tylenol or ibuprofen as needed for discomfort.    With regards to his rash on his buttock, he has had some mild improvement with triamcinolone cream but not entirely so.  Prescription given for clobetasol.  Follow-up if symptoms getting worse or not continuing to improve.      Subjective:       40 y.o. male who recently had a right inguinal hernia repair on 9/10/2020 presents for evaluation intense return of his pain in the right groin radiating into his testicle and up towards his waist over the past 2 days after he was doing some yard work with a .  He has not developed any nausea, vomiting, or fever.  His postoperative course has otherwise been unremarkable up until a couple of days ago.  He has not noticed any testicular swelling or pain of the testicle itself.  He did contact general surgery did not feel that he needed emergent evaluation and does plan to follow-up with them in 2 days.  He is here today primarily to see if he needs to get another ultrasound for evaluation prior to his appointment with general surgery to expedite the process of getting his hernia repaired if there is a problem with the previous surgery.    He also continues to have an itchy rash on both buttocks.  He was previously prescribed a triamcinolone cream which has helped somewhat but not entirely.  He is wondering if there is anything else he can try.  Not noticed any drainage.  He has had no perianal itching.      Patient Active Problem List   Diagnosis     Trochanteric  bursitis     Trochanteric bursitis, unspecified laterality     Right inguinal hernia       No past medical history on file.    Past Surgical History:   Procedure Laterality Date     HERNIA REPAIR Bilateral     6 years old, mesh applied     WISDOM TOOTH EXTRACTION         Current Outpatient Medications on File Prior to Visit   Medication Sig Dispense Refill     acetaminophen (TYLENOL) 325 MG tablet Take 650 mg by mouth every 6 (six) hours as needed for pain.       ibuprofen (ADVIL,MOTRIN) 200 MG tablet Take 200 mg by mouth every 6 (six) hours as needed for pain.       HYDROcodone-acetaminophen 5-325 mg per tablet Take 1 tablet by mouth every 6 (six) hours as needed for pain. 12 tablet 0     No current facility-administered medications on file prior to visit.        No Known Allergies    Family History   Problem Relation Age of Onset     Heart disease Father        Social History     Socioeconomic History     Marital status:      Spouse name: None     Number of children: None     Years of education: None     Highest education level: None   Occupational History     None   Social Needs     Financial resource strain: None     Food insecurity     Worry: None     Inability: None     Transportation needs     Medical: None     Non-medical: None   Tobacco Use     Smoking status: Former Smoker     Types: Cigars     Smokeless tobacco: Never Used     Tobacco comment: cigars   Substance and Sexual Activity     Alcohol use: Yes     Comment: 4x week     Drug use: Never     Sexual activity: None   Lifestyle     Physical activity     Days per week: None     Minutes per session: None     Stress: None   Relationships     Social connections     Talks on phone: None     Gets together: None     Attends Jew service: None     Active member of club or organization: None     Attends meetings of clubs or organizations: None     Relationship status: None     Intimate partner violence     Fear of current or ex partner: None      Emotionally abused: None     Physically abused: None     Forced sexual activity: None   Other Topics Concern     None   Social History Narrative     None         Review of Systems  A 12 point comprehensive review of systems was negative except as noted.      Objective:     Vitals:    10/03/20 1241   BP: 124/80   Pulse: (!) 57   Resp: 17   Temp: 97.9  F (36.6  C)   SpO2: 99%      General appearance: alert, appears stated age and cooperative  Abdomen: soft, non-tender; bowel sounds normal; no masses,  no organomegaly  Male genitalia: No testicular swelling or tenderness.  I do not note an obvious bulge in the right inguinal region.  He is mildly tender to palpation in the right groin.  There is no overlying redness or warmth.  Skin: No obvious rash noted on the buttocks but he does have some excoriation noted on both buttocks from scratching.         This note has been dictated using voice recognition software. Any grammatical or context distortions are unintentional and inherent to the software

## 2021-06-12 NOTE — PROGRESS NOTES
"Artie Davis is a 40 y.o. male who is being evaluated via a billable video visit.      The patient has been notified of following:     \"This video visit will be conducted via a call between you and your physician/provider. We have found that certain health care needs can be provided without the need for an in-person physical exam.  This service lets us provide the care you need with a video conversation.  If a prescription is necessary we can send it directly to your pharmacy.  If lab work is needed we can place an order for that and you can then stop by our lab to have the test done at a later time.    Video visits are billed at different rates depending on your insurance coverage. Please reach out to your insurance provider with any questions.    If during the course of the call the physician/provider feels a video visit is not appropriate, you will not be charged for this service.\"    Patient has given verbal consent to a Video visit? Yes  How would you like to obtain your AVS? AVS Preference: MyChart.  If dropped by the video visit, the video invitation should be sent to: Text to cell phone: [phone]  Will anyone else be joining your video visit? No        Video Start Time: 9:07am        Video-Visit Details    Type of service:  Video Visit    Video End Time (time video stopped): 9:20am  Originating Location (pt. Location): Home    Distant Location (provider location):  [facility] [place]     Platform used for Video Visit: Lorne        "

## 2021-06-12 NOTE — PROGRESS NOTES
General Surgery Clinic Note    S: Pt presents s/p robotic right inguinal hernia repair on 9/10. He was doing well postop until a few days ago when he developed acute right groin pain while working in his yard. He states it was burning and worse with certain activities and laying on his side. He denies any recurrent bulge but states the pain is similar to pain he had before surgery. He denies any fever, chills, nausea, emesis, constipation.    O:   Gen- alert, pleasant, appears well  Abd- soft, nondistended, nontender to palpation,incisions healing well without erythema or drainage  Groin-pt is thin and cord palpable, no hernia defect palpable on right or left and no obvious bulges even with valsalva    A/P: 40y M s/p robotic right inguinal hernia repair on 9/10. Pt now with worsening right groin pain and US showing possible tiny right inguinal hernia but no defect palpable on exam. I think this could be a small lipoma of the cord and normal postop pain. Will obtain CT scan for better evaluation. Recommend pain control with ibuprofen and tylenol as needed. Will call pt with results of CT and discuss further recommendations at that time.    Rosaura Flynn MD  General Surgery  [facility]   [phone]    CT obtained and images reviewed. No findings to suggest recurrent hernia and repair appears intact. Will continue symptomatic management of pain and recommend only light activity (walking, simple errands, etc). Will have pt followup with me in 2 weeks. This was discussed in detail with the patient over the phone who is agreeable to the plan. He will contact me sooner if symptoms worsen.

## 2021-06-12 NOTE — PROGRESS NOTES
General Surgery Clinic Video Visit Note    S: Pt is doing improved from a pain standpoint. States he is having pain mostly anterior thigh and sometimes moves into his leg. He is not having pain at his abdominal incisions or groin and denies any new bulges. He is tolerating a diet and increasing his exercise regimen.    O:   Gen- alert, pleasant, appears well  Groin and abdomen not examined    A/P: 40y M s/p robotic right inguinal hernia repair on 9/10. Still with residual groin or leg pain but it is improving. I think this is still normal postop healing but there is a possibility of ilioinguinal nerve injury.     -Recommend outpatient PT  -Continue exercise as tolerated  -Can use ice and heat packs and ibuprofen as needed\  -RTC 4-6 weeks or sooner if pain worsens  -If he is still having residual pain at this time, will need to consider nerve block and then possible ilioinguinal neurectomy    Rosaura Flynn MD  General Surgery  [place] [phone]

## 2021-06-12 NOTE — TELEPHONE ENCOUNTER
Patient's surgeon or provider: Dr. Flynn    Caller: Artie    Phone Number: [phone]  OK to leave message: Yes    Reason for Call: Pt is having post op concerns. He thinks he might have another hernia in the same area and this one hurts more than previously. I have him scheduled with a post op f/u with Dr. Flynn on Friday but pt would like to be seen sooner if possible. Please call the patient to discuss

## 2021-06-12 NOTE — TELEPHONE ENCOUNTER
"Pt had surgery on 9/10 and had a telephone post op visit on 9/24. He's calling today because he started having a dull/achy pain in the hernia area. He did some light lawn work on Wednesday and right before he quit it started feeling painful. It seemed to go away and then went for a walk yesterday and it started to \"flair\" up and had a long walk home. He has been icing and took ibuprophen last night but would like to speak to a nurse or Dr. Flynn to see if he should be seen or what he should do. He can [phone].   "

## 2021-06-12 NOTE — TELEPHONE ENCOUNTER
I spoke with pt and he is having a lot of Right inguinal pain near hernia site. He states that the pain was so bad over the weekend, he went into [facility] and had an US done, which shows a probably, tiny fay containing hernia. He states he was raking last week and had a flare of pain. Denies any heavy lifting. At that time, I recommended rest, ice, and Ibuprofen and to call us back if it worsens. Pt states this pain is worse than before he had surgery, and he is having difficulties walking. Pt informed I would discuss with Dr. Flynn. In the interim, he will ice and try taking Ibuprofen.

## 2021-06-12 NOTE — PROGRESS NOTES
[facility]   Initial Evaluation  [facility] Discharge Summary  Patient Name: Artie Davis  Date: 3/8/2021  Referral Diagnosis: [unfilled]  Referring provider: Rosuara Flynn MD  Visit Diagnosis:   1. Pain in joint involving right pelvic region and thigh     2. Deep right inguinal pain     3. Right inguinal hernia         Goals:  No data recorded  No data recorded    Patient was seen for 1 visits from 10-28-20 to 10-28-20 with 0 missed appointments.  The patient attended therapy initially, but did not finish the therapy sessions prescribed.  Goals were not fully achieved. Explanation for goals not achieved: Pt attended the initial evaluation appt. but did not follow up with any further visits.    Therapy will be discontinued at this time.  The patient will need a new referral to resume.    Thank you for your referral.  Bennie Lindo  3/8/2021  3:13 PM  Patient Name: Artie Davis  Date of evaluation: 10/28/2020  Referral Diagnosis: Right inguinal hernia  Referring provider: Rosaura Flynn MD  Visit Diagnosis:     ICD-10-CM    1. Pain in joint involving right pelvic region and thigh  M25.551    2. Deep right inguinal pain  R10.31        Assessment:      Pt. is appropriate for skilled PT intervention as outlined in the Plan of Care (POC).  Pt. is a good candidate for skilled PT services to improve pain levels and function.    Goals:  No data recorded  Pt will: be able to sleep without pain interruption  Pt will: be able to sit 1 hr without  pain  Pt will: be able to stand 1 hr without pain  Pt will: be  able to  his 30 pound child with decreased pain ai 0-1/10      Patient's expectations/goals are realistic.    Barriers to Learning or Achieving Goals:  Chronicity of the problem.  Co-morbidities or other medical factors.  .  Trochanteric bursitis  Trochanteric bursitis, unspecified laterality  Right inguinal hernia     Plan / Patient Instructions:        Plan of  Care:   Authorization / Certification Start Date: 10/28/20  Authorization / Certification End Date: 20  Communication with: Referral Source  Patient Related Instruction: Nature of Condition;Body mechanics;Posture;Treatment plan and rationale;Precautions;Next steps;Self Care instruction;Expected outcome;Basis of treatment  Times per Week: 2  Number of Weeks: 12  Number of Visits: 12  Discharge Planning: Provide pt with self releases and home exercise program to manage his R groin pain  Therapeutic Exercise: ROM;Stretching;Strengthening  Neuromuscular Reeducation: kinesio tape  Manual Therapy: soft tissue mobilization;myofascial release;joint mobilization;muscle energy;strain counterstrain      Plan for next visit: Initiate PT per POC     Subjective:         Social information:   Living Situation:single family home, lives with others  and stairs  with railing   Occupation:   Work Status:Working full time   Equipment Available: None    History of Present Illness:    Artie is a 40 y.o. male who presents to therapy today with complaints of R Inguinal, upper thigh pain, R hip. Date of onset/duration of symptoms is 10-1-2020. Onset was sudden and related to trauma. Symptoms are constant. He denies history of similar symptoms. He describes their previous level of function as not limited    Pain Ratin}  Pain rating at best: 1  Pain rating at worst: 5  Pain description: aching, dull and pain    Functional limitations are described as occurring with:   bending  bowel/bladder conrol  sitting prolonged 30 min  sleeping  standing usually the best of all position   Pain related to the distance walk >1 mi    Patient reports benefit from:  rest  , pain medication, cold       Objective:      Patient Outcome Measures :    Modified Oswestry Low Back Pain Disablity Questionnaire  in %: 36     Scores range from 0-100%, where a score of 0% represents minimal pain and maximal function. The minimal clinically  important difference is a score reduction of 12%.    Examination  1. Pain in joint involving right pelvic region and thigh     2. Deep right inguinal pain       Involved side: Right  Posture Observation:      General sitting posture is  fair.  General standing posture is fair.  Shoulder/Thoracic complex: Mild bilateral scapular protraction   Lumbopelvic complex: Mildly decreased lumbar lordosis    + Scan  Visceral  Valves,   Lymphatics R LE Calf 3 sec   Inguinal 2 sec  Illiacs  T duct 3 sec   Clavicles 3 sec.  Restrictions to the Motility of the colon.   Arterial Post Pelvis,   Ligamentous  R Hip,   Periosteum  R Lumbar,   Myochain R Lumbars    Treatment Today     TREATMENT MINUTES COMMENTS   Evaluation 25    Self-care/ Home management     Manual therapy 35 MFR with OA, L5-SI and SI joint releases, Dural Tube Pull   SCS to T duct,   FEM-LV,   SAPH-LV,   Abdominal lymphatics   Neuromuscular Re-education     Therapeutic Activity     Therapeutic Exercises     Gait training     Modality__________________                Total 60    Blank areas are intentional and mean the treatment did not include these items.         PT Evaluation Code: (Please list factors)  Patient History/Comorbidities: Trochanteric bursitis  Trochanteric bursitis, unspecified laterality  Right inguinal hernia  Examination: Lymphatic,  Arterial,   Neuro,  Visceral,  Periosteal    Clinical Presentation: Evolving  Clinical Decision Making: Moderate    Patient History/  Comorbidities Examination  (body structures and functions, activity limitations, and/or participation restrictions) Clinical Presentation Clinical Decision Making (Complexity)   No documented Comorbidities or personal factors 1-2 Elements Stable and/or uncomplicated Low   1-2 documented comorbidities or personal factor 3 Elements Evolving clinical presentation with changing characteristics Moderate   3-4 documented comorbidities or personal factors 4 or more Unstable and unpredictable  High         Bennie Lindo  10/28/2020  10:02 AM

## 2021-06-14 ENCOUNTER — VIRTUAL VISIT (OUTPATIENT)
Dept: PSYCHOLOGY | Facility: CLINIC | Age: 41
End: 2021-06-14
Payer: COMMERCIAL

## 2021-06-14 DIAGNOSIS — F32.0 MAJOR DEPRESSIVE DISORDER, SINGLE EPISODE, MILD (H): ICD-10-CM

## 2021-06-14 DIAGNOSIS — F41.1 GAD (GENERALIZED ANXIETY DISORDER): Primary | ICD-10-CM

## 2021-06-14 PROCEDURE — 90834 PSYTX W PT 45 MINUTES: CPT | Mod: 95

## 2021-06-14 NOTE — PROGRESS NOTES
Progress Note    Patient Name: Artie Davis  Date: 6/14/21         Service Type: Individual      Session Start Time: 10:00am  Session End Time: 10:45am     Session Length: 45 minutes    Session #: 7    Attendees: Client    Service Modality:  Video Visit:      Provider verified identity through the following two step process.  Patient provided:  Patient is known previously to provider    Telemedicine Visit: The patient's condition can be safely assessed and treated via synchronous audio and visual telemedicine encounter.      Reason for Telemedicine Visit: Services only offered telehealth    Originating Site (Patient Location): Patient's home    Distant Site (Provider Location): Provider Remote Setting- Home Office    Consent:  The patient/guardian has verbally consented to: the potential risks and benefits of telemedicine (video visit) versus in person care; bill my insurance or make self-payment for services provided; and responsibility for payment of non-covered services.     Patient would like the video invitation sent by:  Send to e-mail at: [email or web address]    Mode of Communication:  Video Conference via [employer]    As the provider I attest to compliance with applicable laws and regulations related to telemedicine.     Treatment Plan Last Reviewed: 5/10/21  PHQ-9 / MC-7 :  5/10/2021       DATA  Interactive Complexity: No  Crisis: No       Progress Since Last Session (Related to Symptoms / Goals / Homework):   Symptoms: No change Artie continues to experience increased stress which activates his anxiety (rumination, sleep disturbance, difficulty relaxing, inability to control worry).    Homework: Achieved / completed to satisfaction.  Use of TMS information to support increased exercise      Episode of Care Goals: Satisfactory progress - CONTEMPLATION (Considering change and yet undecided); Intervened by assessing the negative and positive thinking (ambivalence)  about behavior change     Current / Ongoing Stressors and Concerns:   Artie has a history of anxiety since college.  Death of younger brother in teen years impacted Artie and family dynamics.  He has had high expectations for himself since adolescence.  His stress level has increased in the past 5 years with marriage, child raising, work stress for him and his wife.  Artie pushes himself to manage more and more to support his family which is taking a significant toll on him physically and emotionally.    Artie continues to report stress managing home projects related to his family's upcoming relocation.  He is making efforts to challenge his pain level and keep exercising which is a de-stress tool for him.  Increased stress between himself and his wife and interest in creating more time together.     Treatment Objective(s) Addressed in This Session:   use cognitive strategies identified in therapy to challenge anxious thoughts  learn & utilize at least 1 assertive communication skills weekly  identify 1 sleep hygiene practices       Intervention:   Motivational Interviewing    MI Intervention: Expressed Empathy/Understanding, Supported Autonomy, Collaboration, Evocation, Open-ended questions and Reflections: simple and complex     Change Talk Expressed by the Patient: Desire to change Ability to change Reasons to change Committment to change Activation    Provider Response to Change Talk: E - Evoked more info from patient about behavior change, A - Affirmed patient's thoughts, decisions, or attempts at behavior change and R - Reflected patient's change talk       Validated Artie's work to manage his stress level with self-care and use of the TMS principles to challenge his belief that his physical pain is caused by a current injury.  He attempted to get additional sleep by napping and used sleep hygiene practices.  Affirmed his awareness of challenges in his marriage and interest in creating time together to increase  support to each other during this transition time.           ASSESSMENT: Current Emotional / Mental Status (status of significant symptoms):   Risk status (Self / Other harm or suicidal ideation)   Patient denies current fears or concerns for personal safety.   Patient denies current or recent suicidal ideation or behaviors.   Patient denies current or recent homicidal ideation or behaviors.   Patient denies current or recent self injurious behavior or ideation.   Patient denies other safety concerns.   Patient reports there has been no change in risk factors since their last session.     Patient reports there has been no change in protective factors since their last session.     Recommended that patient call 911 or go to the local ED should there be a change in any of these risk factors.     Appearance:   Appropriate    Eye Contact:   Good    Psychomotor Behavior: Normal    Attitude:   Cooperative  Interested Friendly   Orientation:   All   Speech    Rate / Production: Talkative Normal     Volume:  Normal    Mood:    Anxious  Normal   Affect:    Appropriate    Thought Content:  Clear  Rumination    Thought Form:  Coherent  Goal Directed  Logical    Insight:    Fair      Medication Review:   No current psychiatric medications prescribed     Medication Compliance:   NA     Changes in Health Issues:   None reported     Chemical Use Review:   Substance Use: Chemical use reviewed, no active concerns identified      Tobacco Use: No current tobacco use.      Diagnosis:  1. MC (generalized anxiety disorder)    2. Major depressive disorder, single episode, mild (H)        Collateral Reports Completed:   Not Applicable    PLAN: (Patient Tasks / Therapist Tasks / Other)  1.  Continue exercise and self-care to reduce stress level.      RACH Dillon July 18, 2021  Reviewed in supervision provided by CARLOS Bedoya July 22, 2021                                        ______________________________________________________________________    Treatment Plan    Patient's Name: Artie Davis  YOB: 1980    Date: 5/10/21    DSM5 Diagnoses: 300.02 (F41.1) Generalized Anxiety Disorder  Psychosocial / Contextual Factors: Historical anxiety symptoms which are increased due to COVID-19, family stress, work stress, relocation  WHODAS:     Referral / Collaboration:  Referral to another professional/service is not indicated at this time..    Anticipated number of session or this episode of care: 12    MeasurableTreatment Goal(s) related to diagnosis / functional impairment(s)  Goal 1: Patient will reduce intensity and frequency of symptoms of anxiety and increased use of tools and manage anxiety.    I will know I've met my goal when I have time to myself and I don't think about the next thing I have to do, my mind isn't racing.      Objective #A (Patient Action)    Patient will use at least 2 coping skills for anxiety management in the next 12 weeks.  Status: New - Date: 5/10/21     Intervention(s)  Therapist will assign homework to practice coping strategies and discuss efficacy of tools in session  provide educational materials on CBT pattern of behavior, deep breathing, exercise, thought restructuring, self-care.      Goal 2: Patient will improve interpersonal effectiveness in relationships.    I will know I've met my goal when we enjoy our time together, doing more things we want to do.      Objective #A (Patient Action)    Status: New - Date: 5/10/21     Patient will learn & utilize at least 1 assertive communication skills weekly.    Intervention(s)  Therapist will assign homework to practice assertive communication and discuss efficacy in session  teach assertiveness skills. AZUCENA, FAST, GIVE, assertive versus passive or aggressive communication, unmet expectations.    Objective #B  Patient will practice setting boundaries 1 times in the next 12 weeks.    Status: New  - Date: 5/10/21     Intervention(s)  Therapist will assign homework practice setting boundaries in relationships and discuss efficacy in session  teach about healthy boundaries. and strategies to support healthy personal boundaries personally and professionally.      Patient has reviewed and agreed to the above plan.      Ramona Valentine, [facility]  May 10, 2021  Reviewed and signed by Man Umana [facility] HospitalSW May 12, 2021

## 2021-06-16 NOTE — PROGRESS NOTES
Assessment:   1. Gastroesophageal reflux disease with esophagitis without hemorrhage  Symptoms have slightly improved. Patient will continue with current medication and start bland diet for three days. Follow up in two weeks.   - omeprazole (PRILOSEC) 20 MG capsule; Take 2 capsules (40 mg total) by mouth daily before breakfast.  Dispense: 60 capsule; Refill: 0    2. Adjustment disorder with anxious mood  Discussed possible diagnosis and treatment. Recommend starting therapy and using hydroxyzine at bed time as needed. Patient will follow up in 6 weeks  - hydrOXYzine pamoate (VISTARIL) 25 MG capsule; Take 1-2 capsules (25-50 mg total) by mouth at bedtime.  Dispense: 60 capsule; Refill: 0  - AMB REFERRAL TO MENTAL HEALTH AND ADDICTION  - Adult (18+); Outpatient Treatment; Individual/Couples/Family/Group Therapy/Health Psychology; [facility]; Any Clinic Location; We will contact you to schedule the appointment or plea...      Plan:   Nonpharmacologic treatments were discussed including: eating smaller meals, elevation of the head of bed at night, avoidance of caffeine, chocolate, nicotine and peppermint, and avoiding tight fitting clothing.  Will start a trial of proton pump inhibitors.  Follow up in 2 weeks or sooner as needed.     Subjective:   Artie Davis is an 40 y.o. male who presents for evaluation of heartburn and abdominal discomfort. He was seen via E-Visit and was started on PPI. He reports that symptoms are still present but he reports that he has not changed his diet. This has been associated with nausea. He denies belching, belching and eructation, choking on food, cough, hoarseness and melena. Symptoms have been present for 3 weeks. He denies dysphagia. He has not lost weight. He denies melena, hematochezia, hematemesis, and coffee ground emesis. Medical therapy in the past has included: proton pump inhibitors.  Patient reported that the last 12 months has been very stressful for both  him and his family.  He reports that his wife works full-time as a pharmacist and manages a leadership position that is very stressful and his job has also been very stressful in the past year and they have toddler daughter with cardiovascular issues. According to patient, with the Covid things have been really stressful for them, trying not to get their daughter sick with COVID-19.  Patient is complaining of increased anxiety with all the changes and challenges. This does affect his sleep at night and focus sometimes. Patient denied any suicidal and homicidal ideation.      The following portions of the patient's history were reviewed and updated as appropriate: allergies, current medications, past family history, past medical history, past social history, past surgical history and problem list.    Review of Systems  A 12 point comprehensive review of systems was negative except as noted.     Objective:   /78   Pulse (!) 52   Wt 189 lb (85.7 kg)   SpO2 100%   BMI 25.81 kg/m    General appearance:   Head: Normocephalic, without obvious abnormality, atraumatic  Neurologic: Grossly normal

## 2021-06-16 NOTE — TELEPHONE ENCOUNTER
Refill Approved    Rx renewed per Medication Renewal Policy. Medication was last renewed on 3/31/21.    Adilson Ernst, [facility] Triage/Med Refill 4/21/2021     Requested Prescriptions   Pending Prescriptions Disp Refills     hydrOXYzine pamoate (VISTARIL) 25 MG capsule [Pharmacy Med Name: HYDROXYZINE ANDREW 25 MG CAP] 60 capsule 0     Sig: TAKE 1-2 CAPSULES (25-50 MG TOTAL) BY MOUTH AT BEDTIME.       Antihistamine Refill Protocol Passed - 4/21/2021  1:08 AM        Passed - Patient has had office visit/physical in last year     Last office visit with prescriber/PCP: 3/31/2021 Ria Cramer FNP OR same dept: 3/31/2021 Ria Cramer FNP OR same specialty: 3/31/2021 Ria Cramer FNP  Last physical: 2/23/2021 Last MTM visit: Visit date not found   Next visit within 3 mo: Visit date not found  Next physical within 3 mo: Visit date not found  Prescriber OR PCP: JULIUS Baez  Last diagnosis associated with med order: 1. Adjustment disorder with anxious mood  - hydrOXYzine pamoate (VISTARIL) 25 MG capsule [Pharmacy Med Name: HYDROXYZINE ANDREW 25 MG CAP]; Take 1-2 capsules (25-50 mg total) by mouth at bedtime.  Dispense: 60 capsule; Refill: 0    2. Gastroesophageal reflux disease with esophagitis without hemorrhage  - omeprazole (PRILOSEC) 20 MG capsule [Pharmacy Med Name: OMEPRAZOLE DR 20 MG CAPSULE]; Take 2 capsules (40 mg total) by mouth daily before breakfast.  Dispense: 60 capsule; Refill: 0    If protocol passes may refill for 12 months if within 3 months of last provider visit (or a total of 15 months).                omeprazole (PRILOSEC) 20 MG capsule [Pharmacy Med Name: OMEPRAZOLE DR 20 MG CAPSULE] 60 capsule 0     Sig: TAKE 2 CAPSULES (40 MG TOTAL) BY MOUTH DAILY BEFORE BREAKFAST.       GI Medications Refill Protocol Passed - 4/21/2021  1:08 AM        Passed - PCP or prescribing provider visit in last 12 or next 3 months.     Last office visit with prescriber/PCP: 3/31/2021 Bela  JULIUS Hardin OR same dept: 3/31/2021 Ria Cramer FNP OR jessica specialty: 3/31/2021 Ria Cramer FNP  Last physical: 2/23/2021 Last MTM visit: Visit date not found   Next visit within 3 mo: Visit date not found  Next physical within 3 mo: Visit date not found  Prescriber OR PCP: JULIUS Baez  Last diagnosis associated with med order: 1. Adjustment disorder with anxious mood  - hydrOXYzine pamoate (VISTARIL) 25 MG capsule [Pharmacy Med Name: HYDROXYZINE ANDREW 25 MG CAP]; Take 1-2 capsules (25-50 mg total) by mouth at bedtime.  Dispense: 60 capsule; Refill: 0    2. Gastroesophageal reflux disease with esophagitis without hemorrhage  - omeprazole (PRILOSEC) 20 MG capsule [Pharmacy Med Name: OMEPRAZOLE DR 20 MG CAPSULE]; Take 2 capsules (40 mg total) by mouth daily before breakfast.  Dispense: 60 capsule; Refill: 0    If protocol passes may refill for 12 months if within 3 months of last provider visit (or a total of 15 months).

## 2021-06-18 NOTE — PATIENT INSTRUCTIONS - HE
Patient Instructions by Danna Ortiz CNP at 2/5/2020  8:40 AM     Author: Danna Ortiz CNP Service: -- Author Type: Nurse Practitioner    Filed: 2/5/2020  9:14 AM Encounter Date: 2/5/2020 Status: Addendum    : Danna Ortiz CNP (Nurse Practitioner)    Related Notes: Original Note by Danna Ortiz CNP (Nurse Practitioner) filed at 2/5/2020  9:12 AM       Dayquil/Nyquil     Ibuprofen as needed.      Eye drops.      Moisturize hands.  Topical 1% hydrocortisone to left hand for itching - twice a day. Aquaphor - use after shower and 1x additional per day     Recheck hand if getting worse.        Patient Education     Bacterial Conjunctivitis    You have an infection in the membranes covering the white part of the eye. This part of the eye is called the conjunctiva. The infection is called conjunctivitis. The most common symptoms of conjunctivitis include a thick, pus-like discharge from the eye, swollen eyelids, redness, eyelids sticking together upon awakening, and a gritty or scratchy feeling in the eye. Your infection was caused by bacteria. It may be treated with medicine. With treatment, the infection takes about 7 to 10 days to resolve.  Home care    Use prescribed antibiotic eye drops or ointment as directed to treat the infection.    Apply a warm compress (towel soaked in warm water) to the affected eye 3 to 4 times a day. Do this just before applying medicine to the eye.    Use a warm, wet cloth to wipe away crusting of the eyelids in the morning. This is caused by mucus drainage during the night. You may also use saline irrigating solution or artificial tears to rinse away mucus in the eye. Do not put a patch over the eye.    Wash your hands before and after touching the infected eye. This is to prevent spreading the infection to the other eye, and to other people. Don't share your towels or washcloths with others.    You may use acetaminophen or ibuprofen to control pain, unless another  medicine was prescribed. (Note: If you have chronic liver or kidney disease or have ever had a stomach ulcer or gastrointestinal bleeding, talk with your doctor before using these medicines.)    Don't wear contact lenses until your eyes have healed and all symptoms are gone.  Follow-up care  Follow up with your healthcare provider, or as advised.  When to seek medical advice  Call your healthcare provider right away if any of these occur:    Worsening vision    Increasing pain in the eye    Increasing swelling or redness of the eyelid    Redness spreading around the eye  Date Last Reviewed: 7/1/2017 2000-2017 [employer]. [address] [place] [address]. All rights reserved. This information is not intended as a substitute for professional medical care. Always follow your healthcare professional's instructions.

## 2021-06-18 NOTE — PATIENT INSTRUCTIONS - HE
Patient Instructions by Judith Abad MD at 3/26/2021 11:59 AM     Author: Judith Abad MD Service: -- Author Type: Physician    Filed: 3/26/2021 11:59 AM Encounter Date: 3/26/2021 Status: Signed    : Judith Abad MD (Physician)           Lifestyle Changes for Controlling GERD  When you have GERD, stomach acid feels as if its backing up toward your mouth. Making lifestyle changes can often improve your symptoms. This is true if you take medicine to control your GERD or not. Talk with your healthcare provider about the following suggestions. They may help you get relief from your symptoms.      Raise your head  Reflux is more likely to happen when youre lying down flat. That's because stomach fluid can flow backward more easily. Raising the head of your bed 4 to 6 inches can help. To do this:    Slide blocks or books under the legs at the head of your bed. Or put a wedge under the mattress. Many [employer] can make a wedge for you. The wedge should go from your waist to the top of your head.    Dont just prop your head up on a few pillows. This increases pressure on your stomach. It can make GERD worse.  Watch your eating habits  Certain foods may increase the acid in your stomach. Or they may relax the lower esophageal sphincter. This makes GERD more likely. Its best to avoid the following if they cause you symptoms:    Coffee, tea, and carbonated drinks (with and without caffeine)    Fatty, fried, or spicy food    Mint, chocolate, onions, tomatoes, and citrus    Peppermint    Any other foods that seem to irritate your stomach or cause you pain  Relieve the pressure  Tips include the following:    Eat smaller meals, even if you have to eat more often.    Dont lie down right after you eat. Wait a few hours for your stomach to empty.    Don't wear tight belts or tight-fitting clothes.    Lose any extra weight.  Tobacco and alcohol  Don't smoke tobacco  or drink alcohol. They can make GERD symptoms worse.  Date Last Reviewed: 7/1/2016 2000-2019 [employer]. [address] [place] [address], [place] [address]. All rights reserved. This information is not intended as a substitute for professional medical care. Always follow your healthcare professional's instructions.        Artie,   Based on the limited information this format allows us to exchange, it sounds as though you have gastritis (inflammtion in the stomach) as well as the typical heartburn or reflux. It is possible there is an ulcer there as well though it seems your pain is not constant all the time.   Certainly ibuprofen and steroids could have contributed to this injury to the stomach in addition the caffeine and stress.  I see you have tried many things but I have no idea how you are taking them. You list prilosec 20 mg in your medications.   I recommend taking it 40mg a day, on an empty stomach first thing in the morning 30 minutes before you eat or drink anything else. That is the best way for it to work. You may take two of the 20mg tabs you already have daily.   It could take a few days to notice the start of improvement.   You may use antacids, TUMS, maalox etc after meals to neutralize acid and coat the stomach as needed. For now consider elevating head of bed.   Avoid caffeine as much as possible for 2 weeks. May use tylenol for pain, avoid ibuprofen and aleve and aspirin.   I recommend follow up with primary care in person or with a phone or virtual visit in 1-2 weeks to determine if you need to have endoscopy done to look at the stomach. Sooner if worse or no better at all.   If you have blood in stools or throw up blood, go to the ER.

## 2021-06-18 NOTE — PATIENT INSTRUCTIONS - HE
Patient Instructions by Ria Cramer FNP at 2/23/2021  7:00 AM     Author: Ria Cramer FNP Service: -- Author Type: Nurse Practitioner    Filed: 2/23/2021  7:50 AM Encounter Date: 2/23/2021 Status: Signed    : Ria Cramer FNP (Nurse Practitioner)           Preventing Skin Cancer     Use sunscreen of SPF 30 or greater. Apply liberally.   Relaxing in the sun may feel good. But it isnt good for your skin. In fact, the suns harmful rays are the major cause of skin cancer. This is a serious disease that can be life-threatening. People of all ages, races, and backgrounds are at risk.  Skin cancer is the most common cancer in the [place] But in most cases, it can be prevented.  Your role in prevention  You can act today to help prevent skin cancer. Start by avoiding the suns UV (ultraviolet) rays. And dont use tanning beds or lamps. They are no safer than the sun. Taking these steps can help keep you from getting skin cancer. It can also help prevent wrinkles and other aging effects caused by the sun. Make sure your children also follow these safeguards. Now is the time to start taking steps to prevent skin cancer.  When you are outdoors  Protect your skin when you go out during the day. Take safety steps whenever you go out to eat, run errands by car or on foot, or do any outdoor activity. There isnt just one easy way to protect your skin. Its best to follow all of these steps:    Wear tightly woven clothing that covers your skin. Put on a wide-brimmed hat to protect your face, ears, and scalp.    Watch the clock. Try to stay out of the sun between 10 a.m. and 4 p.m. That's when the sun's rays are strongest.    Head for the shade or create your own. Use an umbrella when sitting or strolling.    Know that the suns rays can reflect off sand, water, and snow. This can harm your skin. Take extra care when you are near reflective surfaces.    Keep in mind that even when the weather is hazy or cloudy,  "your skin can be exposed to strong UV rays.    Shield your skin with sunscreen. Also use sunscreen on your childrens skin. Keep babies younger than 6 months old out of the sun.  Tips for using sunscreen  To help prevent skin cancer, choose the right sunscreen and use it correctly. Try these tips:    Choose a sunscreen that has an SPF (sun protection factor) of at least 30. Also choose a sunscreen labeled \"broad spectrum. This will protect you from both UVA and UVB (ultraviolet A and B) rays.    If one brand irritates your skin, try another, such as one without fragrance.    Use a water-resistant sunscreen if you swim or sweat.    Use at least 1 ounce of sunscreen to cover exposed areas. This is enough to fill a shot glass. You might need to adjust the amount depending on your body size.    Put the sunscreen on dry skin about 15 minutes before going outdoors. This gives it time to soak in.    Reapply sunscreen every 2 hours. If youre active, do this more often.    Cover any sun-exposed skin, from your face to your feet. Dont forget your scalp, ears, and lips.    Know that while sunscreen helps protect you, it isnt enough. Sunscreens extend the length of time you can be outdoors before your skin starts to get red. But they don't give you total protection. Using sunscreen doesn't mean you can stay out in the sun for an unlimited time. Your skin cells are still being damaged. You should also wear protective clothing. And try to stay out of the sun as much as you can, especially from 10 a.m. to 4 p.m.  Date Last Reviewed: 7/1/2019 2000-2019 [employer]. [address] [place] [address], [place] [address]. All rights reserved. This information is not intended as a substitute for professional medical care. Always follow your healthcare professional's instructions.             "

## 2021-06-21 ENCOUNTER — VIRTUAL VISIT (OUTPATIENT)
Dept: PSYCHOLOGY | Facility: CLINIC | Age: 41
End: 2021-06-21
Payer: COMMERCIAL

## 2021-06-21 DIAGNOSIS — F32.0 MAJOR DEPRESSIVE DISORDER, SINGLE EPISODE, MILD (H): ICD-10-CM

## 2021-06-21 DIAGNOSIS — F41.1 GAD (GENERALIZED ANXIETY DISORDER): Primary | ICD-10-CM

## 2021-06-21 PROCEDURE — 90834 PSYTX W PT 45 MINUTES: CPT | Mod: 95

## 2021-06-21 NOTE — PROGRESS NOTES
Progress Note    Patient Name: Artie Davis  Date: 6/21/21         Service Type: Individual      Session Start Time: 9:00am  Session End Time: 9:45am     Session Length: 45 minutes    Session #: 8    Attendees: Client    Service Modality:  Video Visit:      Provider verified identity through the following two step process.  Patient provided:  Patient is known previously to provider    Telemedicine Visit: The patient's condition can be safely assessed and treated via synchronous audio and visual telemedicine encounter.      Reason for Telemedicine Visit: Services only offered telehealth    Originating Site (Patient Location): Patient's home    Distant Site (Provider Location): Provider Remote Setting    Consent:  The patient/guardian has verbally consented to: the potential risks and benefits of telemedicine (video visit) versus in person care; bill my insurance or make self-payment for services provided; and responsibility for payment of non-covered services.     Patient would like the video invitation sent by:  Send to e-mail at: [email or web address]    Mode of Communication:  Video Conference via [employer]    As the provider I attest to compliance with applicable laws and regulations related to telemedicine.     Treatment Plan Last Reviewed: 5/10/21  PHQ-9 / MC-7 :  5/10/2021       DATA  Interactive Complexity: No  Crisis: No       Progress Since Last Session (Related to Symptoms / Goals / Homework):   Symptoms: No change Artie reports no change to his ongoing symptoms of anxiety or depression since our last session.  Worry, overextension of himself, reduced sleep.    Homework: Achieved / completed to satisfaction.       Episode of Care Goals: Satisfactory progress - CONTEMPLATION (Considering change and yet undecided); Intervened by assessing the negative and positive thinking (ambivalence) about behavior change     Current / Ongoing Stressors and Concerns:   Artie has a  history of anxiety since college.  Death of younger brother in teen years impacted Artie and family dynamics.  He has had high expectations for himself since adolescence.  His stress level has increased in the past 5 years with marriage, child raising, work stress for him and his wife.  Artie pushes himself to manage more and more to support his family which is taking a significant toll on him physically and emotionally.    Artie reports continued stress related to tasks related to prepping their house to sell for relocation.  He struggles to relax and turn off his thoughts.  He also endorses difficulty with his marriage and interest in time as a couple.       Treatment Objective(s) Addressed in This Session:   Feel less tired and more energy during the day   Identify negative self-talk and behaviors: challenge core beliefs, myths, and actions  learn & utilize at least 1 assertive communication skills weekly     Intervention:   Emotion Focused Therapy: Continue to validated the stress Artie is managing regarding support to his spouse and their upcoming relocation.  Artie is engaged in acquiring new information regarding his mental health.  He made connections to his busy behavior which was similar to studies highlighting people who had reduced tolerance for any boredom. Discussed ways to change coping strategies and solidified patterns of behaving which have developed over time.  Neural plasticity and opportunities to shift patterns with awareness and effort.    Artie reports significant stress for himself and his spouse.  He wants to have more time as a couple, increased focus on interpersonal effectiveness and ways to communicate and plan for couples time.  Continue use of I feel/when to communicate with spouse.        ASSESSMENT: Current Emotional / Mental Status (status of significant symptoms):   Risk status (Self / Other harm or suicidal ideation)   Patient denies current fears or concerns for personal  "safety.   Patient denies current or recent suicidal ideation or behaviors.   Patient denies current or recent homicidal ideation or behaviors.   Patient denies current or recent self injurious behavior or ideation.   Patient denies other safety concerns.   Patient reports there has been no change in risk factors since their last session.     Patient reports there has been no change in protective factors since their last session.     Recommended that patient call 911 or go to the local ED should there be a change in any of these risk factors.     Appearance:   Appropriate    Eye Contact:   Good    Psychomotor Behavior: Normal    Attitude:   Cooperative  Interested Friendly   Orientation:   All   Speech    Rate / Production: Talkative Normal     Volume:  Normal    Mood:    Anxious  Normal   Affect:    Appropriate    Thought Content:  Clear  Rumination    Thought Form:  Coherent  Goal Directed  Logical    Insight:    Fair      Medication Review:   No current psychiatric medications prescribed     Medication Compliance:   NA     Changes in Health Issues:   None reported     Chemical Use Review:   Substance Use: Chemical use reviewed, no active concerns identified      Tobacco Use: No current tobacco use.      Diagnosis:  1. MC (generalized anxiety disorder)    2. Major depressive disorder, single episode, mild (H)        Collateral Reports Completed:   Not Applicable    PLAN: (Patient Tasks / Therapist Tasks / Other)  1.  Continue self-care daily.  3.  Interpersonal effectiveness tool \"I feel / when\" to increase productive communication.      Ramona Valentine, [facility] June 21, 2021       Reviewed and signed by Man Umana, [facility] 6[facility]22/21                                    ______________________________________________________________________    Treatment Plan    Patient's Name: Artie Davis  YOB: 1980    Date: 5/10/21    DSM5 Diagnoses: 300.02 (F41.1) Generalized Anxiety Disorder  Psychosocial / " Contextual Factors: Historical anxiety symptoms which are increased due to COVID-19, family stress, work stress, relocation  WHODAS:     Referral / Collaboration:  Referral to another professional/service is not indicated at this time..    Anticipated number of session or this episode of care: 12    MeasurableTreatment Goal(s) related to diagnosis / functional impairment(s)  Goal 1: Patient will reduce intensity and frequency of symptoms of anxiety and increased use of tools and manage anxiety.    I will know I've met my goal when I have time to myself and I don't think about the next thing I have to do, my mind isn't racing.      Objective #A (Patient Action)    Patient will use at least 2 coping skills for anxiety management in the next 12 weeks.  Status: New - Date: 5/10/21     Intervention(s)  Therapist will assign homework to practice coping strategies and discuss efficacy of tools in session  provide educational materials on CBT pattern of behavior, deep breathing, exercise, thought restructuring, self-care.      Goal 2: Patient will improve interpersonal effectiveness in relationships.    I will know I've met my goal when we enjoy our time together, doing more things we want to do.      Objective #A (Patient Action)    Status: New - Date: 5/10/21     Patient will learn & utilize at least 1 assertive communication skills weekly.    Intervention(s)  Therapist will assign homework to practice assertive communication and discuss efficacy in session  teach assertiveness skills. AZUCENA, FAST, GIVE, assertive versus passive or aggressive communication, unmet expectations.    Objective #B  Patient will practice setting boundaries 1 times in the next 12 weeks.    Status: New - Date: 5/10/21     Intervention(s)  Therapist will assign homework practice setting boundaries in relationships and discuss efficacy in session  teach about healthy boundaries. and strategies to support healthy personal boundaries personally and  professionally.      Patient has reviewed and agreed to the above plan.      Ramona Valentine, RACH  May 10, 2021  Reviewed and signed by CARLOS Bedoya May 12, 2021

## 2021-06-28 ENCOUNTER — VIRTUAL VISIT (OUTPATIENT)
Dept: PSYCHOLOGY | Facility: CLINIC | Age: 41
End: 2021-06-28
Payer: COMMERCIAL

## 2021-06-28 DIAGNOSIS — F32.0 MAJOR DEPRESSIVE DISORDER, SINGLE EPISODE, MILD (H): ICD-10-CM

## 2021-06-28 DIAGNOSIS — F41.1 GAD (GENERALIZED ANXIETY DISORDER): Primary | ICD-10-CM

## 2021-06-28 PROCEDURE — 90834 PSYTX W PT 45 MINUTES: CPT | Mod: 95

## 2021-06-28 NOTE — PATIENT INSTRUCTIONS
PLAN: (Patient Tasks / Therapist Tasks / Other)  1.  Continue self-care daily.  2.  What do you want your future to be like with your spouse?  What do you want to do together?  Date night(s), how often?  Plan activities versus waiting for the right time.

## 2021-06-28 NOTE — PROGRESS NOTES
Progress Notes by Danna Ortiz CNP at 2/5/2020  8:40 AM     Author: Danna Ortiz CNP Service: -- Author Type: Nurse Practitioner    Filed: 2/5/2020  9:41 AM Encounter Date: 2/5/2020 Status: Signed    : Danna Ortiz CNP (Nurse Practitioner)       Chief Complaint   Patient presents with   ? left eye drainage first thing in the morning x 4-5 days   ? Rash     left had x 2-3 weeks        ASSESSMENT & PLAN:   Diagnoses and all orders for this visit:    Bacterial conjunctivitis of left eye  -     ciprofloxacin HCl (CILOXAN) 0.3 % ophthalmic solution; Instill 1 to 2 drops into the left eye every 2 hours while awake for 2 days and 1 to 2 drops every 4 hours while awake for the next 5 days  Dispense: 1 mL; Refill: 0    Viral URI    Dermatitis        MDM:  Cipro eyedrops for history of contacts.  No contacts while using eyedrops.  Use new pair after 1 week.    Vital signs, history, and presentation with normal lung sounds consistent with viral URI.  OTCs discussed.    Rash is a very small area of dermatitis or early ringworm.  Moisturizer such as Aquaphor, hydrocortisone twice daily if necessary.  Recheck if area is getting worse.    Supportive care discussed.  See discharge instructions below for specific recommendations given.    At the end of the encounter, I discussed results, diagnosis, medications. Discussed red flags for immediate return to clinic/ER, as well as indications for follow up if no improvement. Patient and/or caregiver understood and agreed to plan. Patient was stable for discharge.    SUBJECTIVE    HPI:  HPI  Artie Davis presents to the walk-in clinic with   Chief Complaint   Patient presents with   ? left eye drainage first thing in the morning x 4-5 days   ? Rash     left had x 2-3 weeks      1. Left eye drainage : Waking up with blurry vision.  Was greenish color.  Was only having it in the morning.  Daughter had pink eye and was treated with polytrim.  Wears contacts.      Taking  Afrin x 2 days.      2. Left hand rash: Small, rough texture on top of left had, occasional itching, .  Has been there for about 2 weeks, not increasing in size.  Patient has been washing his hands frequently lately due to family members having various illnesses.    Associated with: runny nose, sore throat, pressure in head, coughing, no fevers.  Slowly progressing.      Symptoms started: 10 days ago.       Known exposures: daughter, wife has similar URI.     Patient is a frequent runner and has had heart rates in the 40s for a long time.    See ROS for additional symptoms and/or pertinent negatives.       History obtained from the patient.    No past medical history on file.    There are no active non-hospital problems to display for this patient.      No family history on file.    Social History     Tobacco Use   ? Smoking status: Former Smoker     Types: Cigars   ? Smokeless tobacco: Never Used   ? Tobacco comment: no vaping   Substance Use Topics   ? Alcohol use: Not on file       Review of Systems   Constitutional: Negative for appetite change and fatigue.   Gastrointestinal: Negative for vomiting.   All other systems reviewed and are negative.      OBJECTIVE    Vitals:    02/05/20 0856   BP: 114/84   Patient Site: Right Arm   Patient Position: Sitting   Cuff Size: Adult Regular   Pulse: (!) 44   Temp: 97.8  F (36.6  C)   TempSrc: Oral   SpO2: 99%   Weight: 188 lb 12.8 oz (85.6 kg)       Physical Exam  Constitutional:       Appearance: He is well-developed.      Comments: Fatigued appearance   HENT:      Right Ear: External ear normal.      Left Ear: External ear normal.      Nose: Rhinorrhea present. No congestion.      Mouth/Throat:      Pharynx: No posterior oropharyngeal erythema.   Cardiovascular:      Rate and Rhythm: Bradycardia present.      Pulses: Normal pulses.   Pulmonary:      Effort: Pulmonary effort is normal. No respiratory distress.      Breath sounds: Normal breath sounds. No wheezing or  rales.   Chest:      Chest wall: No tenderness.   Musculoskeletal: Normal range of motion.   Lymphadenopathy:      Cervical: No cervical adenopathy.   Skin:     General: Skin is warm and dry.      Findings: Rash (2 areas next to each other dorsal aspect left hand approximately fingertip size, rough, light pink.) present.   Neurological:      Mental Status: He is alert and oriented to person, place, and time.   Psychiatric:         Mood and Affect: Mood normal.         Behavior: Behavior normal.         Thought Content: Thought content normal.         Judgment: Judgment normal.         Labs:  No results found for this or any previous visit (from the past 240 hour(s)).      Radiology:    No results found.    PATIENT INSTRUCTIONS:   Patient Instructions   Dayquil/Nyquil     Ibuprofen as needed.      Eye drops.      Moisturize hands.  Topical 1% hydrocortisone to left hand for itching - twice a day. Aquaphor - use after shower and 1x additional per day     Recheck hand if getting worse.        Patient Education     Bacterial Conjunctivitis    You have an infection in the membranes covering the white part of the eye. This part of the eye is called the conjunctiva. The infection is called conjunctivitis. The most common symptoms of conjunctivitis include a thick, pus-like discharge from the eye, swollen eyelids, redness, eyelids sticking together upon awakening, and a gritty or scratchy feeling in the eye. Your infection was caused by bacteria. It may be treated with medicine. With treatment, the infection takes about 7 to 10 days to resolve.  Home care    Use prescribed antibiotic eye drops or ointment as directed to treat the infection.    Apply a warm compress (towel soaked in warm water) to the affected eye 3 to 4 times a day. Do this just before applying medicine to the eye.    Use a warm, wet cloth to wipe away crusting of the eyelids in the morning. This is caused by mucus drainage during the night. You may also  use saline irrigating solution or artificial tears to rinse away mucus in the eye. Do not put a patch over the eye.    Wash your hands before and after touching the infected eye. This is to prevent spreading the infection to the other eye, and to other people. Don't share your towels or washcloths with others.    You may use acetaminophen or ibuprofen to control pain, unless another medicine was prescribed. (Note: If you have chronic liver or kidney disease or have ever had a stomach ulcer or gastrointestinal bleeding, talk with your doctor before using these medicines.)    Don't wear contact lenses until your eyes have healed and all symptoms are gone.  Follow-up care  Follow up with your healthcare provider, or as advised.  When to seek medical advice  Call your healthcare provider right away if any of these occur:    Worsening vision    Increasing pain in the eye    Increasing swelling or redness of the eyelid    Redness spreading around the eye  Date Last Reviewed: 7/1/2017 2000-2017 [employer]. [address] [place] [address], [place] [address]. All rights reserved. This information is not intended as a substitute for professional medical care. Always follow your healthcare professional's instructions.

## 2021-06-28 NOTE — PROGRESS NOTES
Progress Note    Patient Name: Artie Davis  Date: 6/28/21         Service Type: Individual      Session Start Time: 11:00am  Session End Time: 11:45am     Session Length: 45 minutes    Session #: 9    Attendees: Client    Service Modality:  Video Visit:      Provider verified identity through the following two step process.  Patient provided:  Patient is known previously to provider    Telemedicine Visit: The patient's condition can be safely assessed and treated via synchronous audio and visual telemedicine encounter.      Reason for Telemedicine Visit: Services only offered telehealth    Originating Site (Patient Location): Patient's home    Distant Site (Provider Location): Provider Remote Setting    Consent:  The patient/guardian has verbally consented to: the potential risks and benefits of telemedicine (video visit) versus in person care; bill my insurance or make self-payment for services provided; and responsibility for payment of non-covered services.     Patient would like the video invitation sent by:  Send to e-mail at: [email or web address]    Mode of Communication:  Video Conference via Amwell    As the provider I attest to compliance with applicable laws and regulations related to telemedicine.     Treatment Plan Last Reviewed: 5/10/21  PHQ-9 / MC-7 :  5/10/2021       DATA  Interactive Complexity: No  Crisis: No       Progress Since Last Session (Related to Symptoms / Goals / Homework):   Symptoms: Improving Artie reports a reduction in his symptoms of anxiety and depression since our last session.  Positive developments with their relocation are an influence.    Homework: Achieved / completed to satisfaction.  Increased self-care, and awareness of thoughts and impact on him physically.      Episode of Care Goals: Satisfactory progress - CONTEMPLATION (Considering change and yet undecided); Intervened by assessing the negative and positive thinking  (ambivalence) about behavior change     Current / Ongoing Stressors and Concerns:   Artie has a history of anxiety since college.  Death of younger brother in teen years impacted Artie and family dynamics.  He has had high expectations for himself since adolescence.  His stress level has increased in the past 5 years with marriage, child raising, work stress for him and his wife.  Artie pushes himself to manage more and more to support his family which is taking a significant toll on him physically and emotionally.    Artie reports a reduction in stress due to positive steps in their relocation process.  Their house sold within the first week on the market, his spouse received confirmation that she passed her licensing exam.  Interest in continuing positive progress and planning to set themselves up for success as people, as a couple, as parents.       Treatment Objective(s) Addressed in This Session:   Feel less tired and more energy during the day   Identify negative self-talk and behaviors: challenge core beliefs, myths, and actions  learn & utilize at least 1 assertive communication skills weekly     Intervention:   Emotion Focused Therapy: Affirmed the steps Artie and his spouse took to successfully sell their house and pass her exam.   Discussion regarding ways to plan for the future they want after relocation.  They recognized job stress was a factor and actively made changes to seek other employment.  What do they want their relationship to be like?  What time do they want as a couple?  Plan to make this happen versus waiting until they have the time, be proactive versus reactive.        ASSESSMENT: Current Emotional / Mental Status (status of significant symptoms):   Risk status (Self / Other harm or suicidal ideation)   Patient denies current fears or concerns for personal safety.   Patient denies current or recent suicidal ideation or behaviors.   Patient denies current or recent homicidal ideation or  behaviors.   Patient denies current or recent self injurious behavior or ideation.   Patient denies other safety concerns.   Patient reports there has been no change in risk factors since their last session.     Patient reports there has been no change in protective factors since their last session.     Recommended that patient call 911 or go to the local ED should there be a change in any of these risk factors.     Appearance:   Appropriate    Eye Contact:   Good    Psychomotor Behavior: Normal    Attitude:   Cooperative  Interested Friendly   Orientation:   All   Speech    Rate / Production: Talkative Normal     Volume:  Normal    Mood:    Anxious  Normal   Affect:    Appropriate    Thought Content:  Clear  Rumination    Thought Form:  Coherent  Goal Directed  Logical    Insight:    Fair      Medication Review:   No current psychiatric medications prescribed     Medication Compliance:   NA     Changes in Health Issues:   None reported     Chemical Use Review:   Substance Use: Chemical use reviewed, no active concerns identified      Tobacco Use: No current tobacco use.      Diagnosis:  1. MC (generalized anxiety disorder)    2. Major depressive disorder, single episode, mild (H)        Collateral Reports Completed:   Not Applicable    PLAN: (Patient Tasks / Therapist Tasks / Other)  1.  Continue self-care daily.  2.  What do you want your future to be like with your spouse?  What do you want to do together?  Date night(s), how often?  Plan activities versus waiting for the right time.      Ramona Valentine, [facility]  June 28, 2021          Reviewed and signed by Man Umana, [facility] 7/1/21                          ______________________________________________________________________    Treatment Plan    Patient's Name: Artie Davis  YOB: 1980    Date: 5/10/21    DSM5 Diagnoses: 300.02 (F41.1) Generalized Anxiety Disorder  Psychosocial / Contextual Factors: Historical anxiety symptoms which are  increased due to COVID-19, family stress, work stress, relocation  WHODAS:     Referral / Collaboration:  Referral to another professional/service is not indicated at this time..    Anticipated number of session or this episode of care: 12    MeasurableTreatment Goal(s) related to diagnosis / functional impairment(s)  Goal 1: Patient will reduce intensity and frequency of symptoms of anxiety and increased use of tools and manage anxiety.    I will know I've met my goal when I have time to myself and I don't think about the next thing I have to do, my mind isn't racing.      Objective #A (Patient Action)    Patient will use at least 2 coping skills for anxiety management in the next 12 weeks.  Status: New - Date: 5/10/21     Intervention(s)  Therapist will assign homework to practice coping strategies and discuss efficacy of tools in session  provide educational materials on CBT pattern of behavior, deep breathing, exercise, thought restructuring, self-care.      Goal 2: Patient will improve interpersonal effectiveness in relationships.    I will know I've met my goal when we enjoy our time together, doing more things we want to do.      Objective #A (Patient Action)    Status: New - Date: 5/10/21     Patient will learn & utilize at least 1 assertive communication skills weekly.    Intervention(s)  Therapist will assign homework to practice assertive communication and discuss efficacy in session  teach assertiveness skills. AZUCENA, FAST, GIVE, assertive versus passive or aggressive communication, unmet expectations.    Objective #B  Patient will practice setting boundaries 1 times in the next 12 weeks.    Status: New - Date: 5/10/21     Intervention(s)  Therapist will assign homework practice setting boundaries in relationships and discuss efficacy in session  teach about healthy boundaries. and strategies to support healthy personal boundaries personally and professionally.      Patient has reviewed and agreed to  the above plan.      Ramona Valentine, SW  May 10, 2021  Reviewed and signed by Man Umana [facility] May 12, 2021

## 2021-06-30 NOTE — PROGRESS NOTES
Progress Notes by Ria Cramer FNP at 2/23/2021  7:00 AM     Author: Ria Cramer FNP Service: -- Author Type: Nurse Practitioner    Filed: 2/23/2021  9:13 AM Encounter Date: 2/23/2021 Status: Signed    : Ria Cramer FNP (Nurse Practitioner)       MALE PREVENTATIVE EXAM    Assessment and Plan:   Patient has been advised of split billing requirements and indicates understanding: Yes    1. Encounter for general adult medical examination without abnormal findings  Healthy male exam  - Comprehensive Metabolic Panel  - Hemoglobin  - Lipid [place]- FASTING  - Vitamin D, Total (25-Hydroxy)    2. Encounter for screening for lipoid disorders  - Lipid [place]- FASTING     Next follow up:  No follow-ups on file.    Immunization Review  Adult Imm Review: No immunizations due today    I discussed the following with the patient:   Adult Healthy Living: Importance of regular exercise  Healthy nutrition  Getting adequate sleep  Stress management  Use of seat belts  Distracted driving  Helmets  Sporting equipment safety  Firearm safety  Supplement use  Herbal medications/alternative medical therapies    I have had an Advance Directives discussion with the patient.    Subjective:   Chief Complaint: Artie Davis is an 40 y.o. male here for a preventative health visit.    Patient has been advised of split billing requirements and indicates understanding: Yes  HPI: Patients concern today includes mild lightheadedness that happens once in while when he is trying to get up very first from the floor while playing with his daughter. He reports that dizziness only last for few seconds. He reports that he runs a lot and his heart rates are mostly in the 40s but since after his last hernia surgery his heart has gone up in the 50s and 60s as he has not been running. He denied any other symptoms. Patient also reports that he has back issues but he is seeing a back specialist and is also doing physical therapy.  "Patient denied chest pain, shortness of breath, fever and chills.     Healthy Habits  Are you taking a daily aspirin? No  Do you typically exercising at least 40 min, 3-4 times per week?  Yes  Do you usually eat at least 4 servings of fruit and vegetables a day, include whole grains and fiber and avoid regularly eating high fat foods? Yes  Have you had an eye exam in the past two years? Yes  Do you see a dentist twice per year? Yes  Do you have any concerns regarding sleep? No    Safety Screen  If you own firearms, are they secured in a locked gun cabinet or with trigger locks? The patient does not own any firearms  Do you feel you are safe where you are living?: Yes (2/23/2021  7:04 AM)  Do you feel you are safe in your relationship(s)?: Yes (2/23/2021  7:04 AM)      Review of Systems:  Please see above.  The rest of the review of systems are negative for all systems.     Cancer Screening     Patient has no health maintenance due at this time          Patient Care Team:  Ria rCamer FNP as PCP - General (Nurse Practitioner)  Alexander Del Rosario PA-C as Assigned Musculoskeletal Provider  Rosaura Flynn MD as Assigned Surgical Provider        History     Reviewed By Date/Time Sections Reviewed    iSn Mccormick CMA 2/23/2021  7:08 AM Tobacco    Sin Mccormick CMA 2/23/2021  7:06 AM Tobacco            Objective:   Vital Signs:   Visit Vitals  /71 (Patient Site: Left Arm, Patient Position: Sitting, Cuff Size: Adult Large)   Pulse 64   Ht 5' 11.75\" (1.822 m)   Wt 181 lb 12.8 oz (82.5 kg)   BMI 24.83 kg/m           PHYSICAL EXAM  /71 (Patient Site: Left Arm, Patient Position: Sitting, Cuff Size: Adult Large)   Pulse 64   Ht 5' 11.75\" (1.822 m)   Wt 181 lb 12.8 oz (82.5 kg)   BMI 24.83 kg/m    General appearance: alert, appears stated age and cooperative  Head: Normocephalic, without obvious abnormality, atraumatic  Eyes: conjunctivae/corneas clear. PERRL, EOM's intact. Fundi benign.  Ears: " normal TM's and external ear canals both ears  Throat: lips, mucosa, and tongue normal; teeth and gums normal  Neck: no adenopathy, no carotid bruit, no JVD, supple, symmetrical, trachea midline and thyroid not enlarged, symmetric, no tenderness/mass/nodules  Back: symmetric, no curvature. ROM normal. No CVA tenderness.  Lungs: clear to auscultation bilaterally  Chest wall: no tenderness  Heart: regular rate and rhythm, S1, S2 normal, no murmur, click, rub or gallop  Abdomen: soft, non-tender; bowel sounds normal; no masses,  no organomegaly  Male genitalia: normal, deferred  Extremities: extremities normal, atraumatic, no cyanosis or edema  Pulses: 2+ and symmetric  Skin: Skin color, texture, turgor normal. No rashes or lesions  Lymph nodes: Cervical, supraclavicular, and axillary nodes normal.  Neurologic: Grossly normal             Medication List          Accurate as of February 23, 2021  7:54 AM. If you have any questions, ask your nurse or doctor.            STOP taking these medications    acetaminophen 325 MG tablet  Also known as: TYLENOL  Stopped by: JULIUS Baez     ibuprofen 200 MG tablet  Also known as: ADVIL,MOTRIN  Stopped by: JULIUS Baez            Additional Screenings Completed Today:

## 2021-07-12 ENCOUNTER — VIRTUAL VISIT (OUTPATIENT)
Dept: PSYCHOLOGY | Facility: CLINIC | Age: 41
End: 2021-07-12
Payer: COMMERCIAL

## 2021-07-12 DIAGNOSIS — F32.0 MAJOR DEPRESSIVE DISORDER, SINGLE EPISODE, MILD (H): ICD-10-CM

## 2021-07-12 DIAGNOSIS — F41.1 GAD (GENERALIZED ANXIETY DISORDER): Primary | ICD-10-CM

## 2021-07-12 PROCEDURE — 90834 PSYTX W PT 45 MINUTES: CPT | Mod: 95

## 2021-07-12 ASSESSMENT — ANXIETY QUESTIONNAIRES
2. NOT BEING ABLE TO STOP OR CONTROL WORRYING: SEVERAL DAYS
GAD7 TOTAL SCORE: 9
1. FEELING NERVOUS, ANXIOUS, OR ON EDGE: MORE THAN HALF THE DAYS
GAD7 TOTAL SCORE: 9
7. FEELING AFRAID AS IF SOMETHING AWFUL MIGHT HAPPEN: NOT AT ALL
3. WORRYING TOO MUCH ABOUT DIFFERENT THINGS: SEVERAL DAYS
GAD7 TOTAL SCORE: 9
5. BEING SO RESTLESS THAT IT IS HARD TO SIT STILL: MORE THAN HALF THE DAYS
7. FEELING AFRAID AS IF SOMETHING AWFUL MIGHT HAPPEN: NOT AT ALL
4. TROUBLE RELAXING: MORE THAN HALF THE DAYS
6. BECOMING EASILY ANNOYED OR IRRITABLE: SEVERAL DAYS
8. IF YOU CHECKED OFF ANY PROBLEMS, HOW DIFFICULT HAVE THESE MADE IT FOR YOU TO DO YOUR WORK, TAKE CARE OF THINGS AT HOME, OR GET ALONG WITH OTHER PEOPLE?: SOMEWHAT DIFFICULT

## 2021-07-12 ASSESSMENT — PATIENT HEALTH QUESTIONNAIRE - PHQ9
SUM OF ALL RESPONSES TO PHQ QUESTIONS 1-9: 7
SUM OF ALL RESPONSES TO PHQ QUESTIONS 1-9: 7
10. IF YOU CHECKED OFF ANY PROBLEMS, HOW DIFFICULT HAVE THESE PROBLEMS MADE IT FOR YOU TO DO YOUR WORK, TAKE CARE OF THINGS AT HOME, OR GET ALONG WITH OTHER PEOPLE: SOMEWHAT DIFFICULT

## 2021-07-12 NOTE — PROGRESS NOTES
"                                           Progress Note    Patient Name: Artie Davis  Date: 7/12/21         Service Type: Individual      Session Start Time: 11:00am  Session End Time: 11:45am     Session Length: 45 minutes    Session #: 10    Attendees: Client    Service Modality:  Telephone   The patient has been notified of the following:      \"We have found that certain health care needs can be provided without the need for a face to face visit.  This service lets us provide the care you need with a phone conversation.       I will have full access to your [place] medical record during this entire phone call.   I will be taking notes for your medical record.      Since this is like an office visit, we will bill your insurance company for this service.       There are potential benefits and risks of telephone visits (e.g. limits to patient confidentiality) that differ from in-person visits.?  Confidentiality still applies for telephone services, and nobody will record the visit.  It is important to be in a quiet, private space that is free of distractions (including cell phone or other devices) during the visit.??      If during the course of the call I believe a telephone visit is not appropriate, you will not be charged for this service\"     Consent has been obtained for this service by care team member: Yes        Treatment Plan Last Reviewed: 5/10/21  PHQ-9 / MC-7 :    PHQ 4/12/2021 5/10/2021 7/12/2021   PHQ-9 Total Score 10 10 7   Q9: Thoughts of better off dead/self-harm past 2 weeks Not at all Not at all Not at all     MC-7 SCORE 4/12/2021 5/10/2021 7/12/2021   Total Score 14 (moderate anxiety) 17 (severe anxiety) 9 (mild anxiety)   Total Score 14 17 9       DATA  Interactive Complexity: No  Crisis: No       Progress Since Last Session (Related to Symptoms / Goals / Homework):   Symptoms: Worsening Artie reports an increase in his symptoms of anxiety due to stress of relocation, finalizing details and " information.      Homework: Achieved / completed to satisfaction.  Increased self-care, and awareness of thoughts and impact on him physically.      Episode of Care Goals: Satisfactory progress - CONTEMPLATION (Considering change and yet undecided); Intervened by assessing the negative and positive thinking (ambivalence) about behavior change     Current / Ongoing Stressors and Concerns:   Artie has a history of anxiety since college.  Death of younger brother in teen years impacted Artie and family dynamics.  He has had high expectations for himself since adolescence.  His stress level has increased in the past 5 years with marriage, child raising, work stress for him and his wife.  Artie pushes himself to manage more and more to support his family which is taking a significant toll on him physically and emotionally.    Artie reports an increase in stress due to next steps and decisions needed to move their relocation process forward.  He is experiencing increased physical pain and discomfort which he attributes to the increase in stress.  He continues to work on dividing the tasks between himself and his wife to support his stress level.         Treatment Objective(s) Addressed in This Session:   Feel less tired and more energy during the day   Identify negative self-talk and behaviors: challenge core beliefs, myths, and actions  learn & utilize at least 1 assertive communication skills weekly     Intervention:   Motivational Interviewing    MI Intervention: Expressed Empathy/Understanding, Supported Autonomy, Collaboration, Evocation, Open-ended questions and Reflections: simple and complex     Change Talk Expressed by the Patient: Desire to change Ability to change Reasons to change Need to change Activation    Provider Response to Change Talk: E - Evoked more info from patient about behavior change, A - Affirmed patient's thoughts, decisions, or attempts at behavior change and R - Reflected patient's change talk          Artie reports increased stress which manifests as physical pain, eye discomfort.  Affirmed his awareness and steps to increase self-care to reduce intensity of physical symptoms.  CBT pattern of behavior discussed as additional tool to increase awareness.        ASSESSMENT: Current Emotional / Mental Status (status of significant symptoms):   Risk status (Self / Other harm or suicidal ideation)   Patient denies current fears or concerns for personal safety.   Patient denies current or recent suicidal ideation or behaviors.   Patient denies current or recent homicidal ideation or behaviors.   Patient denies current or recent self injurious behavior or ideation.   Patient denies other safety concerns.   Patient reports there has been no change in risk factors since their last session.     Patient reports there has been no change in protective factors since their last session.     Recommended that patient call 911 or go to the local ED should there be a change in any of these risk factors.     Appearance:   Appropriate    Eye Contact:   Good    Psychomotor Behavior: Normal    Attitude:   Cooperative  Interested Friendly   Orientation:   All   Speech    Rate / Production: Talkative Normal     Volume:  Normal    Mood:    Anxious  Normal   Affect:    Appropriate    Thought Content:  Clear  Rumination    Thought Form:  Coherent  Goal Directed  Logical    Insight:    Fair      Medication Review:   No current psychiatric medications prescribed     Medication Compliance:   NA     Changes in Health Issues:   None reported     Chemical Use Review:   Substance Use: Chemical use reviewed, no active concerns identified      Tobacco Use: No current tobacco use.      Diagnosis:  1. MC (generalized anxiety disorder)    2. Major depressive disorder, single episode, mild (H)        Collateral Reports Completed:   Not Applicable    PLAN: (Patient Tasks / Therapist Tasks / Other)  1.  Continue self-care daily.  2.  CBT pattern of  behavior, cognitive triangle: The cognitive triangle shows how thoughts, emotions, and behaviors affect one  another. This means changing your thoughts will change how you feel and behave  [email or web address]://[email or web address].com/worksheets/cbt-triangle.pdf      Ramona Valentine, [facility]  July 12, 2021                         Reviewed and supervision provided by Man Umana, [facility] 7/19/21  ______________________________________________________________________    Treatment Plan    Patient's Name: Artie Davis  YOB: 1980    Date: 5/10/21    DSM5 Diagnoses: 300.02 (F41.1) Generalized Anxiety Disorder  Psychosocial / Contextual Factors: Historical anxiety symptoms which are increased due to COVID-19, family stress, work stress, relocation  WHODAS:     Referral / Collaboration:  Referral to another professional/service is not indicated at this time..    Anticipated number of session or this episode of care: 12    MeasurableTreatment Goal(s) related to diagnosis / functional impairment(s)  Goal 1: Patient will reduce intensity and frequency of symptoms of anxiety and increased use of tools and manage anxiety.    I will know I've met my goal when I have time to myself and I don't think about the next thing I have to do, my mind isn't racing.      Objective #A (Patient Action)    Patient will use at least 2 coping skills for anxiety management in the next 12 weeks.  Status: New - Date: 5/10/21     Intervention(s)  Therapist will assign homework to practice coping strategies and discuss efficacy of tools in session  provide educational materials on CBT pattern of behavior, deep breathing, exercise, thought restructuring, self-care.      Goal 2: Patient will improve interpersonal effectiveness in relationships.    I will know I've met my goal when we enjoy our time together, doing more things we want to do.      Objective #A (Patient Action)    Status: New - Date: 5/10/21     Patient will learn & utilize at least 1  assertive communication skills weekly.    Intervention(s)  Therapist will assign homework to practice assertive communication and discuss efficacy in session  teach assertiveness skills. AZUCENA, FAST, GIVE, assertive versus passive or aggressive communication, unmet expectations.    Objective #B  Patient will practice setting boundaries 1 times in the next 12 weeks.    Status: New - Date: 5/10/21     Intervention(s)  Therapist will assign homework practice setting boundaries in relationships and discuss efficacy in session  teach about healthy boundaries. and strategies to support healthy personal boundaries personally and professionally.      Patient has reviewed and agreed to the above plan.      Ramona Valentine, [facility]  May 10, 2021  Reviewed and signed by Man Umana [facility] May 12, 2021

## 2021-07-12 NOTE — PATIENT INSTRUCTIONS
PLAN: (Patient Tasks / Therapist Tasks / Other)  1.  Continue self-care daily.  2.  CBT pattern of behavior, cognitive triangle: The cognitive triangle shows how thoughts, emotions, and behaviors affect one  another. This means changing your thoughts will change how you feel and behave  [email or web address]/worksheets/cbt-triangle.pdf

## 2021-07-13 ASSESSMENT — PATIENT HEALTH QUESTIONNAIRE - PHQ9: SUM OF ALL RESPONSES TO PHQ QUESTIONS 1-9: 7

## 2021-07-13 ASSESSMENT — ANXIETY QUESTIONNAIRES: GAD7 TOTAL SCORE: 9

## 2021-07-19 ENCOUNTER — VIRTUAL VISIT (OUTPATIENT)
Dept: PSYCHOLOGY | Facility: CLINIC | Age: 41
End: 2021-07-19
Payer: COMMERCIAL

## 2021-07-19 DIAGNOSIS — F41.1 GAD (GENERALIZED ANXIETY DISORDER): Primary | ICD-10-CM

## 2021-07-19 DIAGNOSIS — F32.0 MAJOR DEPRESSIVE DISORDER, SINGLE EPISODE, MILD (H): ICD-10-CM

## 2021-07-19 PROCEDURE — 90834 PSYTX W PT 45 MINUTES: CPT | Mod: 95

## 2021-07-19 NOTE — PROGRESS NOTES
"                                           Progress Note    Patient Name: Artie Dvais  Date: 7/19/21         Service Type: Individual      Session Start Time: 11:00am  Session End Time: 11:45am     Session Length: 45 minutes    Session #: 11    Attendees: Client    Service Modality:  Telephone   The patient has been notified of the following:      \"We have found that certain health care needs can be provided without the need for a face to face visit.  This service lets us provide the care you need with a phone conversation.       I will have full access to your [facility] medical record during this entire phone call.   I will be taking notes for your medical record.      Since this is like an office visit, we will bill your insurance company for this service.       There are potential benefits and risks of telephone visits (e.g. limits to patient confidentiality) that differ from in-person visits.?  Confidentiality still applies for telephone services, and nobody will record the visit.  It is important to be in a quiet, private space that is free of distractions (including cell phone or other devices) during the visit.??      If during the course of the call I believe a telephone visit is not appropriate, you will not be charged for this service\"     Consent has been obtained for this service by care team member: Yes        Treatment Plan Last Reviewed: 5/10/21  PHQ-9 / MC-7 :    PHQ 4/12/2021 5/10/2021 7/12/2021   PHQ-9 Total Score 10 10 7   Q9: Thoughts of better off dead/self-harm past 2 weeks Not at all Not at all Not at all     MC-7 SCORE 4/12/2021 5/10/2021 7/12/2021   Total Score 14 (moderate anxiety) 17 (severe anxiety) 9 (mild anxiety)   Total Score 14 17 9       DATA  Interactive Complexity: No  Crisis: No       Progress Since Last Session (Related to Symptoms / Goals / Homework):   Symptoms: Improving Artie reports progress with tasks related to relocation which has reduced his stress level and and " anxiety symptoms.      Homework: Achieved / completed to satisfaction.  Time with spouse, self-care.      Episode of Care Goals: Satisfactory progress - CONTEMPLATION (Considering change and yet undecided); Intervened by assessing the negative and positive thinking (ambivalence) about behavior change     Current / Ongoing Stressors and Concerns:   Artie has a history of anxiety since college.  Death of younger brother in teen years impacted Artie and family dynamics.  He has had high expectations for himself since adolescence.  His stress level has increased in the past 5 years with marriage, child raising, work stress for him and his wife.  Artie pushes himself to manage more and more to support his family which is taking a significant toll on him physically and emotionally.    Artie reports progress regarding his stress level due to decisions made regarding next steps in their relocation.  Boundaries and ways to stay connected to his mom, who lives in [place], once his family moves to [place].      Artie used information regarding CBT pattern of behavior combined with his [employer] gentry to gain understanding of his thoughts, emotions connecting to his physical pain.    His take away from the [employer] gent[employer] information:  3 things to help treat, 1. Self-compassion, 2. Acknowledge the thoughts, don't buy in to the thoughts, you are not your thoughts, bringing you back to the same place/habit/pattern. 3. Lean in to and notice a nice physical sensation (currently), redirect energy to something positive, lens of safety/not using the lens of fear      Treatment Objective(s) Addressed in This Session:   Identify negative self-talk and behaviors: challenge core beliefs, myths, and actions  compile a list of boundaries that they would like to set with others. family     Intervention:   Motivational Interviewing    MI Intervention: Expressed Empathy/Understanding, Supported Autonomy, Collaboration, Evocation, Open-ended questions,  Reflections: simple and complex, Change talk (evoked) and Reframe     Change Talk Expressed by the Patient: Desire to change Ability to change Reasons to change Activation Taking steps    Provider Response to Change Talk: E - Evoked more info from patient about behavior change, A - Affirmed patient's thoughts, decisions, or attempts at behavior change, R - Reflected patient's change talk and S - Summarized patient's change talk statements         Validated Artie's work to seek out information to support his awareness of CBT patterns (curable gentry talk).  Increased mindfulness regarding exercise limits to reduce stress, yet, not impact his physical health which could negatively impact his ability to spend time with his daughter. Awareness of positive impact of self-care on his mood and physical health.  This awareness allows him to see it differently, as a beneficial proactive habit, versus waiting until there's time available.    Tools discussed to support mood - thoughts are thoughts, not facts.; Is this helpful?        ASSESSMENT: Current Emotional / Mental Status (status of significant symptoms):   Risk status (Self / Other harm or suicidal ideation)   Patient denies current fears or concerns for personal safety.   Patient denies current or recent suicidal ideation or behaviors.   Patient denies current or recent homicidal ideation or behaviors.   Patient denies current or recent self injurious behavior or ideation.   Patient denies other safety concerns.   Patient reports there has been no change in risk factors since their last session.     Patient reports there has been no change in protective factors since their last session.     Recommended that patient call 911 or go to the local ED should there be a change in any of these risk factors.     Appearance:   Appropriate    Eye Contact:   Good    Psychomotor Behavior: Normal  Restless    Attitude:   Cooperative  Interested Friendly  Attentive   Orientation:   All   Speech    Rate / Production: Normal/ Responsive Talkative    Volume:  Normal    Mood:    Anxious  Normal   Affect:    Appropriate    Thought Content:  Clear  Rumination    Thought Form:  Coherent  Goal Directed  Logical    Insight:    Good  and Intellectual Insight     Medication Review:   No current psychiatric medications prescribed     Medication Compliance:   NA     Changes in Health Issues:   None reported     Chemical Use Review:   Substance Use: Chemical use reviewed, no active concerns identified      Tobacco Use: No current tobacco use.      Diagnosis:  1. MC (generalized anxiety disorder)    2. Major depressive disorder, single episode, mild (H)        Collateral Reports Completed:   Not Applicable    PLAN: (Patient Tasks / Therapist Tasks / Other)  1.  Continue self-care daily.  2.  Use awareness of patterns of thinking to shift thoughts versus remaining stuck in our unhelpful, outdated patterns.  Tools to use:  Thoughts are thoughts, not facts.  Is this helpful?  How is this helping me right now?      Ramona Valentine, [facility]  July 19, 2021      Reviewed and signed by Man Umana, [facility] 7/22/21                     ______________________________________________________________________    Treatment Plan    Patient's Name: Artie Davis  YOB: 1980    Date: 5/10/21    DSM5 Diagnoses: 300.02 (F41.1) Generalized Anxiety Disorder  Psychosocial / Contextual Factors: Historical anxiety symptoms which are increased due to COVID-19, family stress, work stress, relocation  WHODAS:     Referral / Collaboration:  Referral to another professional/service is not indicated at this time..    Anticipated number of session or this episode of care: 12    MeasurableTreatment Goal(s) related to diagnosis / functional impairment(s)  Goal 1: Patient will reduce intensity and frequency of symptoms of anxiety and increased use of tools and manage anxiety.    I will know I've  met my goal when I have time to myself and I don't think about the next thing I have to do, my mind isn't racing.      Objective #A (Patient Action)    Patient will use at least 2 coping skills for anxiety management in the next 12 weeks.  Status: New - Date: 5/10/21     Intervention(s)  Therapist will assign homework to practice coping strategies and discuss efficacy of tools in session  provide educational materials on CBT pattern of behavior, deep breathing, exercise, thought restructuring, self-care.      Goal 2: Patient will improve interpersonal effectiveness in relationships.    I will know I've met my goal when we enjoy our time together, doing more things we want to do.      Objective #A (Patient Action)    Status: New - Date: 5/10/21     Patient will learn & utilize at least 1 assertive communication skills weekly.    Intervention(s)  Therapist will assign homework to practice assertive communication and discuss efficacy in session  teach assertiveness skills. AZUCENA, FAST, GIVE, assertive versus passive or aggressive communication, unmet expectations.    Objective #B  Patient will practice setting boundaries 1 times in the next 12 weeks.    Status: New - Date: 5/10/21     Intervention(s)  Therapist will assign homework practice setting boundaries in relationships and discuss efficacy in session  teach about healthy boundaries. and strategies to support healthy personal boundaries personally and professionally.      Patient has reviewed and agreed to the above plan.      Ramona Valentine, [facility]  May 10, 2021  Reviewed and signed by Man Umana [facility] May 12, 2021   no ROM deficits were identified

## 2021-07-19 NOTE — PATIENT INSTRUCTIONS
PLAN: (Patient Tasks / Therapist Tasks / Other)  1.  Continue self-care daily.  2.  Use awareness of patterns of thinking to shift thoughts versus remaining stuck in our unhelpful, outdated patterns.  Tools to use:  Thoughts are thoughts, not facts.  Is this helpful?  How is this helping me right now?

## 2021-08-04 ENCOUNTER — VIRTUAL VISIT (OUTPATIENT)
Dept: PSYCHOLOGY | Facility: CLINIC | Age: 41
End: 2021-08-04
Payer: COMMERCIAL

## 2021-08-04 DIAGNOSIS — F32.0 MAJOR DEPRESSIVE DISORDER, SINGLE EPISODE, MILD (H): ICD-10-CM

## 2021-08-04 DIAGNOSIS — F41.1 GAD (GENERALIZED ANXIETY DISORDER): Primary | ICD-10-CM

## 2021-08-04 PROCEDURE — 90834 PSYTX W PT 45 MINUTES: CPT | Mod: 95

## 2021-08-04 NOTE — PROGRESS NOTES
Progress Note    Patient Name: Artie Davis  Date: 8/4/21         Service Type: Individual      Session Start Time: 9:00am  Session End Time: 9:45am     Session Length: 45 minutes    Session #: 12    Attendees: Client    Service Modality:  Video Visit:      Provider verified identity through the following two step process.  Patient provided:  Patient is known previously to provider    Telemedicine Visit: The patient's condition can be safely assessed and treated via synchronous audio and visual telemedicine encounter.      Reason for Telemedicine Visit: Services only offered telehealth    Originating Site (Patient Location): Patient's home    Distant Site (Provider Location): Provider Remote Setting- Home Office    Consent:  The patient/guardian has verbally consented to: the potential risks and benefits of telemedicine (video visit) versus in person care; bill my insurance or make self-payment for services provided; and responsibility for payment of non-covered services.     Patient would like the video invitation sent by:  Send to e-mail at: [email or web address]    Mode of Communication:  Video Conference via Amwell    As the provider I attest to compliance with applicable laws and regulations related to telemedicine.       Treatment Plan Last Reviewed: 5/10/21  PHQ-9 / MC-7 :    PHQ 4/12/2021 5/10/2021 7/12/2021   PHQ-9 Total Score 10 10 7   Q9: Thoughts of better off dead/self-harm past 2 weeks Not at all Not at all Not at all     MC-7 SCORE 4/12/2021 5/10/2021 7/12/2021   Total Score 14 (moderate anxiety) 17 (severe anxiety) 9 (mild anxiety)   Total Score 14 17 9       DATA  Interactive Complexity: No  Crisis: No       Progress Since Last Session (Related to Symptoms / Goals / Homework):   Symptoms: No change Artie reports a reduction in his stress which is a positive impact on his anxiety since our last session.      Homework: Achieved / completed to satisfaction.   Self care daily, challenging anxious thoughts regarding medical symptoms.      Episode of Care Goals: Satisfactory progress - ACTION (Actively working towards change); Intervened by reinforcing change plan / affirming steps taken     Current / Ongoing Stressors and Concerns:   Artie has a history of anxiety since college.  Death of younger brother in teen years impacted Artie and family dynamics.  He has had high expectations for himself since adolescence.  His stress level has increased in the past 5 years with marriage, child raising, work stress for him and his wife.  Artie pushes himself to manage more and more to support his family which is taking a significant toll on him physically and emotionally.    Artie reports progress regarding his stress level due to decisions made regarding next steps in their relocation.  Boundaries and ways to stay connected to his mom, who lives in [place], once his family moves to [place].      Artie used information regarding CBT pattern of behavior combined with his [place] gentry to gain understanding of his thoughts, emotions connecting to his physical pain.  He continues to use this insight to challenge his thoughts regarding pain connected with exercising.    Artie spent time with his extended family before moving out of [facility].  He expressed insight regarding his values and his actions which support those values.  His values and actions are different than his family which was difficult to see.          Treatment Objective(s) Addressed in This Session:   practice setting boundaries 1 times in the next 3 weeks     Intervention:   Motivational Interviewing    MI Intervention: Expressed Empathy/Understanding, Supported Autonomy, Collaboration, Evocation, Open-ended questions, Reflections: simple and complex, Change talk (evoked) and Reframe     Change Talk Expressed by the Patient: Desire to change Ability to change Reasons to change Activation Taking steps    Provider Response to  "Change Talk: E - Evoked more info from patient about behavior change, A - Affirmed patient's thoughts, decisions, or attempts at behavior change and R - Reflected patient's change talk         Validated Artie's awareness of his choices and actions which reflect his personal values.  Affirmed his steps since high school to increase his cognitive flexibility and openmindedness. Artie connected his former behavior in school \"did it myself, I didn't need anyone's help\".  He realized this was a tougher path and support was beneficial.  Artie relayed his experience to family in an effort to support their growth and development.        ASSESSMENT: Current Emotional / Mental Status (status of significant symptoms):   Risk status (Self / Other harm or suicidal ideation)   Patient denies current fears or concerns for personal safety.   Patient denies current or recent suicidal ideation or behaviors.   Patient denies current or recent homicidal ideation or behaviors.   Patient denies current or recent self injurious behavior or ideation.   Patient denies other safety concerns.   Patient reports there has been no change in risk factors since their last session.     Patient reports there has been no change in protective factors since their last session.     Recommended that patient call 911 or go to the local ED should there be a change in any of these risk factors.     Appearance:   Appropriate    Eye Contact:   Good    Psychomotor Behavior: Normal  Restless    Attitude:   Cooperative  Interested Friendly Attentive   Orientation:   All   Speech    Rate / Production: Normal/ Responsive Talkative    Volume:  Normal    Mood:    Anxious  Normal   Affect:    Appropriate    Thought Content:  Clear  Rumination    Thought Form:  Coherent  Goal Directed  Logical    Insight:    Good  and Intellectual Insight     Medication Review:   No current psychiatric medications prescribed     Medication Compliance:   NA     Changes in Health " Issues:   None reported     Chemical Use Review:   Substance Use: Chemical use reviewed, no active concerns identified      Tobacco Use: No current tobacco use.      Diagnosis:  1. MC (generalized anxiety disorder)    2. Major depressive disorder, single episode, mild (H)        Collateral Reports Completed:   Not Applicable    PLAN: (Patient Tasks / Therapist Tasks / Other)  1.  Continue self-care daily.  2.  Awareness of anxious health related thoughts use of tools to challenge thoughts.      Ramona Valentine, [facility] August 4, 2021     Reviewed and signed by Man Umana. [facility] 8/5/21              ______________________________________________________________________    Treatment Plan    Patient's Name: Artie Davis  YOB: 1980    Date: 5/10/21    DSM5 Diagnoses: 300.02 (F41.1) Generalized Anxiety Disorder  Psychosocial / Contextual Factors: Historical anxiety symptoms which are increased due to COVID-19, family stress, work stress, relocation  WHODAS:     Referral / Collaboration:  Referral to another professional/service is not indicated at this time..    Anticipated number of session or this episode of care: 12    MeasurableTreatment Goal(s) related to diagnosis / functional impairment(s)  Goal 1: Patient will reduce intensity and frequency of symptoms of anxiety and increased use of tools and manage anxiety.    I will know I've met my goal when I have time to myself and I don't think about the next thing I have to do, my mind isn't racing.      Objective #A (Patient Action)    Patient will use at least 2 coping skills for anxiety management in the next 12 weeks.  Status: New - Date: 5/10/21     Intervention(s)  Therapist will assign homework to practice coping strategies and discuss efficacy of tools in session  provide educational materials on CBT pattern of behavior, deep breathing, exercise, thought restructuring, self-care.      Goal 2: Patient will improve interpersonal effectiveness  in relationships.    I will know I've met my goal when we enjoy our time together, doing more things we want to do.      Objective #A (Patient Action)    Status: New - Date: 5/10/21     Patient will learn & utilize at least 1 assertive communication skills weekly.    Intervention(s)  Therapist will assign homework to practice assertive communication and discuss efficacy in session  teach assertiveness skills. AZUCENA, FAST, GIVE, assertive versus passive or aggressive communication, unmet expectations.    Objective #B  Patient will practice setting boundaries 1 times in the next 12 weeks.    Status: New - Date: 5/10/21     Intervention(s)  Therapist will assign homework practice setting boundaries in relationships and discuss efficacy in session  teach about healthy boundaries. and strategies to support healthy personal boundaries personally and professionally.      Patient has reviewed and agreed to the above plan.      Ramona Valentine, [facility]  May 10, 2021  Reviewed and signed by Man Umana [facility] May 12, 2021

## 2021-08-05 NOTE — PATIENT INSTRUCTIONS
PLAN: (Patient Tasks / Therapist Tasks / Other)  1.  Continue self-care daily.  2.  Awareness of anxious health related thoughts use of tools to challenge thoughts.

## 2021-08-18 ENCOUNTER — VIRTUAL VISIT (OUTPATIENT)
Dept: PSYCHOLOGY | Facility: CLINIC | Age: 41
End: 2021-08-18
Payer: COMMERCIAL

## 2021-08-18 DIAGNOSIS — F32.0 MAJOR DEPRESSIVE DISORDER, SINGLE EPISODE, MILD (H): ICD-10-CM

## 2021-08-18 DIAGNOSIS — F41.1 GAD (GENERALIZED ANXIETY DISORDER): Primary | ICD-10-CM

## 2021-08-18 PROCEDURE — 90834 PSYTX W PT 45 MINUTES: CPT | Mod: 95

## 2021-08-18 NOTE — PROGRESS NOTES
Discharge Summary  Multiple Sessions    Client Name: Artie Davis MRN#: [id number] YOB: 1980    Discharge Date:   August 18, 2021    Service Modality: Video Visit:      Provider verified identity through the following two step process.  Patient provided:  Patient is known previously to provider    Telemedicine Visit: The patient's condition can be safely assessed and treated via synchronous audio and visual telemedicine encounter.      Reason for Telemedicine Visit: Services only offered telehealth    Originating Site (Patient Location): Patient's home    Distant Site (Provider Location): Provider Remote Setting- Home Office    Consent:  The patient/guardian has verbally consented to: the potential risks and benefits of telemedicine (video visit) versus in person care; bill my insurance or make self-payment for services provided; and responsibility for payment of non-covered services.     Patient would like the video invitation sent by:  Send to e-mail at: [email or web address]    Mode of Communication:  Video Conference via Amwell    As the provider I attest to compliance with applicable laws and regulations related to telemedicine.    Service Type: Individual      Session Start Time: 3:30pm  Session End Time: 4:20pm      Session Length: 45 - 50     Session #: 13     Attendees: Client      Focus of Treatment Objective(s):  Client's presenting concerns included: Anxiety - worry, rumination, high stress, physical pain  Stage of Change at time of Discharge: MAINTENANCE (Working to maintain change, with risk of relapse)    Medication Adherence:  NA    Chemical Use:  Yes    Assessment: Current Emotional / Mental Status (status of significant symptoms):    Risk status (Self / Other harm or suicidal ideation)  Client denies current fears or concerns for personal safety.  Client denies current or recent suicidal ideation or behaviors.  Client denies current or recent homicidal ideation or  behaviors.  Client denies current or recent self injurious behavior or ideation.  Client denies other safety concerns.  A safety and risk management plan has not been developed at this time, however client was given the after-hours number should there be a change in any of these risk factors.    Appearance:   Appropriate   Eye Contact:   Good   Psychomotor Behavior: Normal  Restless   Attitude:   Cooperative  Interested Friendly Attentive  Orientation:   All  Speech   Rate / Production: Normal/ Responsive Talkative   Volume:  Normal   Mood:    Normal  Affect:    Appropriate   Thought Content:  Clear   Thought Form:  Coherent  Goal Directed  Logical   Insight:   Good     DSM5 Diagnoses: (Sustained by DSM5 Criteria Listed Above)  Diagnoses: 296.21 (F32.0) Major Depressive Disorder, Single Episode, Mild _  300.02 (F41.1) Generalized Anxiety Disorder  Psychosocial & Contextual Factors: Artie is a , 40 year old,  male.  He and his wife both work full-time in demanding jobs.  They have a young daughter who has chronic medical concerns.  COVID-19 has increased their stress level, work from home, limited childcare, as well as concerns for their daughter's health.  Artie reports years of chronic pain due to injuries from running.  In 2021 he realized his pain was connected to his stress level.  He has increased his awareness, coping skills, challenging his thought pattern to create change in his pain level.  WHODAS 2.0 (12 item) Score: n/a    Reason for Discharge:  Client is satisfied with progress, Goals completed and [place]ating out of state      Aftercare Plan:  Client may resume counseling services at any time in the future by calling the [facility] Intake Office, [phone].      Ramona Valentine, [facility]  August 18, 2021  Reviewed and signed by CARLOS Bedoya 8/24/21

## 2021-10-02 ENCOUNTER — HEALTH MAINTENANCE LETTER (OUTPATIENT)
Age: 41
End: 2021-10-02

## 2022-05-14 ENCOUNTER — HEALTH MAINTENANCE LETTER (OUTPATIENT)
Age: 42
End: 2022-05-14

## 2022-09-03 ENCOUNTER — HEALTH MAINTENANCE LETTER (OUTPATIENT)
Age: 42
End: 2022-09-03

## 2023-02-04 NOTE — ANESTHESIA PREPROCEDURE EVALUATION
Anesthesia Evaluation      Patient summary reviewed   No history of anesthetic complications     Airway   Mallampati: II  Neck ROM: full   Pulmonary - normal exam    breath sounds clear to auscultation  (+) a smoker                         Cardiovascular - negative ROS and normal exam  Exercise tolerance: > or = 10 METS  Rhythm: regular  Rate: normal,         Neuro/Psych - negative ROS     Endo/Other - negative ROS   (-) no obesity     GI/Hepatic/Renal      Comments: IH          Dental - normal exam                        Anesthesia Plan  Planned anesthetic: general endotracheal    ASA 1   Induction: intravenous   Anesthetic plan and risks discussed with: patient  Anesthesia plan special considerations: antiemetics,              Yes

## 2023-06-03 ENCOUNTER — HEALTH MAINTENANCE LETTER (OUTPATIENT)
Age: 43
End: 2023-06-03

## 2024-07-06 ENCOUNTER — HEALTH MAINTENANCE LETTER (OUTPATIENT)
Age: 44
End: 2024-07-06